# Patient Record
Sex: FEMALE | Race: WHITE | NOT HISPANIC OR LATINO | Employment: UNEMPLOYED | URBAN - METROPOLITAN AREA
[De-identification: names, ages, dates, MRNs, and addresses within clinical notes are randomized per-mention and may not be internally consistent; named-entity substitution may affect disease eponyms.]

---

## 2023-09-01 PROBLEM — G57.12 MERALGIA PARESTHETICA OF LEFT SIDE: Status: ACTIVE | Noted: 2023-09-01

## 2023-09-01 PROBLEM — K21.9 GERD WITHOUT ESOPHAGITIS: Status: ACTIVE | Noted: 2023-09-01

## 2023-09-01 PROBLEM — E04.9 ENLARGED THYROID: Status: ACTIVE | Noted: 2023-09-01

## 2023-09-01 PROBLEM — R41.840 ATTENTION DEFICIT: Status: ACTIVE | Noted: 2023-09-01

## 2023-09-01 PROBLEM — F41.1 GENERALIZED ANXIETY DISORDER: Status: ACTIVE | Noted: 2023-09-01

## 2023-09-01 PROBLEM — E04.2 MULTIPLE THYROID NODULES: Status: ACTIVE | Noted: 2023-09-01

## 2023-09-01 PROBLEM — L20.82 FLEXURAL ECZEMA: Status: ACTIVE | Noted: 2023-09-01

## 2023-09-01 PROBLEM — M25.852 FEMOROACETABULAR IMPINGEMENT OF LEFT HIP: Status: ACTIVE | Noted: 2023-09-01

## 2023-09-01 PROBLEM — D50.9 IRON DEFICIENCY ANEMIA: Status: ACTIVE | Noted: 2023-09-01

## 2023-09-01 PROBLEM — N91.2 AMENORRHEA: Status: ACTIVE | Noted: 2023-09-01

## 2023-09-01 PROBLEM — M76.899 ADDUCTOR TENDONITIS: Status: ACTIVE | Noted: 2023-09-01

## 2023-09-01 PROBLEM — E55.9 VITAMIN D DEFICIENCY: Status: ACTIVE | Noted: 2023-09-01

## 2023-09-01 RX ORDER — OMEPRAZOLE 20 MG/1
1 CAPSULE, DELAYED RELEASE ORAL 2 TIMES DAILY
COMMUNITY
Start: 2019-09-19 | End: 2023-11-30 | Stop reason: SDUPTHER

## 2023-09-01 RX ORDER — ACETAMINOPHEN 160 MG/5ML
15 SUSPENSION ORAL EVERY 6 HOURS
COMMUNITY
Start: 2023-02-06 | End: 2023-11-30 | Stop reason: ALTCHOICE

## 2023-09-01 RX ORDER — FERROUS SULFATE 325(65) MG
2 TABLET ORAL EVERY OTHER DAY
COMMUNITY

## 2023-09-01 RX ORDER — ESCITALOPRAM OXALATE 10 MG/1
1.5 TABLET ORAL DAILY
COMMUNITY
End: 2023-10-11

## 2023-09-01 RX ORDER — DICLOFENAC SODIUM 50 MG/1
1 TABLET, DELAYED RELEASE ORAL
COMMUNITY
Start: 2020-07-22

## 2023-09-01 RX ORDER — BUSPIRONE HYDROCHLORIDE 10 MG/1
1 TABLET ORAL 2 TIMES DAILY
COMMUNITY

## 2023-09-01 RX ORDER — FERROUS SULFATE 220 (44)/5
5 SOLUTION, ORAL ORAL DAILY
COMMUNITY
Start: 2023-02-10 | End: 2023-11-30 | Stop reason: ALTCHOICE

## 2023-09-23 LAB
BASOPHILS (10*3/UL) IN BLOOD BY AUTOMATED COUNT: 0.03 X10E9/L (ref 0–0.1)
BASOPHILS/100 LEUKOCYTES IN BLOOD BY AUTOMATED COUNT: 0.5 % (ref 0–2)
EOSINOPHILS (10*3/UL) IN BLOOD BY AUTOMATED COUNT: 0.1 X10E9/L (ref 0–0.7)
EOSINOPHILS/100 LEUKOCYTES IN BLOOD BY AUTOMATED COUNT: 1.8 % (ref 0–6)
ERYTHROCYTE DISTRIBUTION WIDTH (RATIO) BY AUTOMATED COUNT: 14.3 % (ref 11.5–14.5)
ERYTHROCYTE MEAN CORPUSCULAR HEMOGLOBIN CONCENTRATION (G/DL) BY AUTOMATED: 30.9 G/DL (ref 32–36)
ERYTHROCYTE MEAN CORPUSCULAR VOLUME (FL) BY AUTOMATED COUNT: 89 FL (ref 80–100)
ERYTHROCYTES (10*6/UL) IN BLOOD BY AUTOMATED COUNT: 4.52 X10E12/L (ref 4–5.2)
FERRITIN (UG/LL) IN SER/PLAS: 16 UG/L (ref 8–150)
HEMATOCRIT (%) IN BLOOD BY AUTOMATED COUNT: 40.1 % (ref 36–46)
HEMOGLOBIN (G/DL) IN BLOOD: 12.4 G/DL (ref 12–16)
IMMATURE GRANULOCYTES/100 LEUKOCYTES IN BLOOD BY AUTOMATED COUNT: 0.2 % (ref 0–0.9)
IRON (UG/DL) IN SER/PLAS: 54 UG/DL (ref 35–150)
IRON BINDING CAPACITY (UG/DL) IN SER/PLAS: 466 UG/DL (ref 240–445)
IRON SATURATION (%) IN SER/PLAS: 12 % (ref 25–45)
LEUKOCYTES (10*3/UL) IN BLOOD BY AUTOMATED COUNT: 5.5 X10E9/L (ref 4.4–11.3)
LYMPHOCYTES (10*3/UL) IN BLOOD BY AUTOMATED COUNT: 2.02 X10E9/L (ref 1.2–4.8)
LYMPHOCYTES/100 LEUKOCYTES IN BLOOD BY AUTOMATED COUNT: 36.7 % (ref 13–44)
MONOCYTES (10*3/UL) IN BLOOD BY AUTOMATED COUNT: 0.44 X10E9/L (ref 0.1–1)
MONOCYTES/100 LEUKOCYTES IN BLOOD BY AUTOMATED COUNT: 8 % (ref 2–10)
NEUTROPHILS (10*3/UL) IN BLOOD BY AUTOMATED COUNT: 2.91 X10E9/L (ref 1.2–7.7)
NEUTROPHILS/100 LEUKOCYTES IN BLOOD BY AUTOMATED COUNT: 52.8 % (ref 40–80)
PLATELETS (10*3/UL) IN BLOOD AUTOMATED COUNT: 368 X10E9/L (ref 150–450)
THYROTROPIN (MIU/L) IN SER/PLAS BY DETECTION LIMIT <= 0.05 MIU/L: 1.33 MIU/L (ref 0.44–3.98)

## 2023-10-11 DIAGNOSIS — F41.1 GENERALIZED ANXIETY DISORDER: Primary | ICD-10-CM

## 2023-10-11 RX ORDER — ESCITALOPRAM OXALATE 10 MG/1
15 TABLET ORAL DAILY
Qty: 45 TABLET | Refills: 3 | Status: SHIPPED | OUTPATIENT
Start: 2023-10-11 | End: 2024-04-04 | Stop reason: WASHOUT

## 2023-10-11 NOTE — TELEPHONE ENCOUNTER
Requested Prescriptions     Pending Prescriptions Disp Refills    escitalopram (Lexapro) 10 mg tablet [Pharmacy Med Name: ESCITALOPRAM 10 MG TABLET] 45 tablet 3     Sig: take 1 AND 1/2 TABLETS by mouth once daily

## 2023-11-29 PROBLEM — N91.2 AMENORRHEA: Status: RESOLVED | Noted: 2023-09-01 | Resolved: 2023-11-29

## 2023-11-29 PROBLEM — G43.109 MIGRAINE WITH AURA AND WITHOUT STATUS MIGRAINOSUS, NOT INTRACTABLE: Status: ACTIVE | Noted: 2023-11-29

## 2023-11-30 ENCOUNTER — OFFICE VISIT (OUTPATIENT)
Dept: PRIMARY CARE | Facility: CLINIC | Age: 23
End: 2023-11-30
Payer: COMMERCIAL

## 2023-11-30 VITALS
TEMPERATURE: 97.4 F | HEART RATE: 78 BPM | OXYGEN SATURATION: 98 % | WEIGHT: 124.8 LBS | BODY MASS INDEX: 22.11 KG/M2 | SYSTOLIC BLOOD PRESSURE: 102 MMHG | DIASTOLIC BLOOD PRESSURE: 64 MMHG

## 2023-11-30 DIAGNOSIS — J02.9 ACUTE PHARYNGITIS, UNSPECIFIED ETIOLOGY: ICD-10-CM

## 2023-11-30 DIAGNOSIS — G43.109 MIGRAINE WITH AURA AND WITHOUT STATUS MIGRAINOSUS, NOT INTRACTABLE: Primary | ICD-10-CM

## 2023-11-30 DIAGNOSIS — F41.1 GENERALIZED ANXIETY DISORDER: ICD-10-CM

## 2023-11-30 DIAGNOSIS — K21.9 GERD WITHOUT ESOPHAGITIS: ICD-10-CM

## 2023-11-30 LAB — POC RAPID STREP: NEGATIVE

## 2023-11-30 PROCEDURE — 99214 OFFICE O/P EST MOD 30 MIN: CPT | Performed by: FAMILY MEDICINE

## 2023-11-30 PROCEDURE — 87880 STREP A ASSAY W/OPTIC: CPT | Mod: QW | Performed by: FAMILY MEDICINE

## 2023-11-30 PROCEDURE — 87081 CULTURE SCREEN ONLY: CPT | Performed by: FAMILY MEDICINE

## 2023-11-30 PROCEDURE — 1036F TOBACCO NON-USER: CPT | Performed by: FAMILY MEDICINE

## 2023-11-30 RX ORDER — FLUOXETINE 10 MG/1
10 CAPSULE ORAL DAILY
Qty: 30 CAPSULE | Refills: 1 | Status: SHIPPED | OUTPATIENT
Start: 2023-11-30 | End: 2024-01-25

## 2023-11-30 RX ORDER — RIZATRIPTAN BENZOATE 10 MG/1
10 TABLET, ORALLY DISINTEGRATING ORAL ONCE AS NEEDED
Qty: 9 TABLET | Refills: 2 | Status: SHIPPED | OUTPATIENT
Start: 2023-11-30 | End: 2023-12-30

## 2023-11-30 RX ORDER — OMEPRAZOLE 20 MG/1
20 CAPSULE, DELAYED RELEASE ORAL 2 TIMES DAILY
Qty: 180 CAPSULE | Refills: 1 | Status: SHIPPED | OUTPATIENT
Start: 2023-11-30 | End: 2024-02-13 | Stop reason: SDUPTHER

## 2023-11-30 ASSESSMENT — PATIENT HEALTH QUESTIONNAIRE - PHQ9
1. LITTLE INTEREST OR PLEASURE IN DOING THINGS: NOT AT ALL
2. FEELING DOWN, DEPRESSED OR HOPELESS: NOT AT ALL
SUM OF ALL RESPONSES TO PHQ9 QUESTIONS 1 & 2: 0

## 2023-11-30 ASSESSMENT — ENCOUNTER SYMPTOMS
OCCASIONAL FEELINGS OF UNSTEADINESS: 0
SORE THROAT: 1
COUGH: 0
LOSS OF SENSATION IN FEET: 0
HEADACHES: 1
DEPRESSION: 0
SWOLLEN GLANDS: 1
SHORTNESS OF BREATH: 0

## 2023-11-30 ASSESSMENT — PAIN SCALES - GENERAL: PAINLEVEL: 0-NO PAIN

## 2023-11-30 NOTE — PROGRESS NOTES
Subjective   Patient ID: Geno Julien is a 23 y.o. female who presents for Migraine (F/U).    Headache:  -Type of headache: migraine with aura  -Symptoms: daily headache.  Not improving.    -Frequency: daily, worse with cold weather  -Associated symptoms: aura, light and sound sensitive  -Treatment: zolmitripan - no benefit.    -Specialist:  -Past Medications:         Cyproheptadine         Excedrin Migraine         Maxalt - sedation         Tylenol         Ibuprofen    Anxiety  -F/U: Patient is very on edge, irritable, feels burned out.   has sleep disorder -   -Symptoms have been not improved.   -She denies current suicidal and homicidal ideation.    -Current Treatment: buspar - not helping  -Counseling: not currently enrolled - limited time  -Previous treatment includes:        Wellbutrin         Sertraline     GERD  -Using omeprazole twice a day - working better.  Would like a new script.          Migraine   Associated symptoms include a sore throat and swollen glands. Pertinent negatives include no coughing.   Sore Throat   This is a new problem. The current episode started yesterday. The problem has been unchanged. There has been no fever. Associated symptoms include congestion, headaches and swollen glands. Pertinent negatives include no coughing, drooling, ear discharge or shortness of breath. She has tried nothing for the symptoms.        Review of Systems   HENT:  Positive for congestion and sore throat. Negative for drooling and ear discharge.    Respiratory:  Negative for cough and shortness of breath.    Neurological:  Positive for headaches.       Objective   There were no vitals taken for this visit.    Physical Exam  HENT:      Right Ear: Tympanic membrane and ear canal normal.      Left Ear: Tympanic membrane and ear canal normal.      Nose: No rhinorrhea.      Mouth/Throat:      Mouth: Mucous membranes are moist.      Palate: No lesions.      Pharynx: Oropharyngeal exudate and  posterior oropharyngeal erythema present. No pharyngeal swelling or uvula swelling.   Eyes:      General:         Right eye: No discharge.         Left eye: No discharge.   Cardiovascular:      Rate and Rhythm: Normal rate and regular rhythm.   Pulmonary:      Effort: Pulmonary effort is normal.      Breath sounds: No wheezing or rhonchi.   Lymphadenopathy:      Cervical: No cervical adenopathy.   Neurological:      Mental Status: She is alert.         Assessment/Plan   Diagnoses and all orders for this visit:  Migraine with aura and without status migrainosus, not intractable  GERD without esophagitis  Acute pharyngitis, unspecified etiology    Patient Instructions   -For GERD - we will change to omeprazole 20mg twice a day -working better    -For anxiety - likely due to caregiver burnout.  Recommend time for yourself to recoup - see if /parents /in laws could help watch kids, give you a break.      -For migraines - recommend nurtec every other day.  This will prevent migraines.  If you get a migraine - use maxalt to break migraine.  Ok to use tylenol and ibuprofen.     -For sore throat - rapid strep neg.  Culture ordered.  Tylenol/ibuprofen.     Phone visit in 6 weeks and in office in 3 months.

## 2023-12-01 ENCOUNTER — PATIENT MESSAGE (OUTPATIENT)
Dept: PRIMARY CARE | Facility: CLINIC | Age: 23
End: 2023-12-01
Payer: COMMERCIAL

## 2023-12-01 DIAGNOSIS — G43.109 MIGRAINE WITH AURA AND WITHOUT STATUS MIGRAINOSUS, NOT INTRACTABLE: ICD-10-CM

## 2023-12-01 DIAGNOSIS — H10.33 ACUTE CONJUNCTIVITIS OF BOTH EYES, UNSPECIFIED ACUTE CONJUNCTIVITIS TYPE: Primary | ICD-10-CM

## 2023-12-01 RX ORDER — OFLOXACIN 3 MG/ML
1 SOLUTION/ DROPS OPHTHALMIC 4 TIMES DAILY
Qty: 5 ML | Refills: 0 | Status: SHIPPED | OUTPATIENT
Start: 2023-12-01 | End: 2023-12-06

## 2023-12-04 LAB — S PYO THROAT QL CULT: NORMAL

## 2023-12-13 ENCOUNTER — TELEPHONE (OUTPATIENT)
Dept: PRIMARY CARE | Facility: CLINIC | Age: 23
End: 2023-12-13
Payer: COMMERCIAL

## 2023-12-13 ENCOUNTER — PATIENT MESSAGE (OUTPATIENT)
Dept: PRIMARY CARE | Facility: CLINIC | Age: 23
End: 2023-12-13
Payer: COMMERCIAL

## 2023-12-13 DIAGNOSIS — G43.109 MIGRAINE WITH AURA AND WITHOUT STATUS MIGRAINOSUS, NOT INTRACTABLE: Primary | ICD-10-CM

## 2024-01-02 PROBLEM — E04.9 ENLARGED THYROID: Status: RESOLVED | Noted: 2023-09-01 | Resolved: 2024-01-02

## 2024-01-02 PROBLEM — G57.12 MERALGIA PARESTHETICA OF LEFT SIDE: Status: RESOLVED | Noted: 2023-09-01 | Resolved: 2024-01-02

## 2024-01-02 PROBLEM — M76.899 ADDUCTOR TENDONITIS: Status: RESOLVED | Noted: 2023-09-01 | Resolved: 2024-01-02

## 2024-01-03 ENCOUNTER — OFFICE VISIT (OUTPATIENT)
Dept: PRIMARY CARE | Facility: CLINIC | Age: 24
End: 2024-01-03
Payer: COMMERCIAL

## 2024-01-03 VITALS
SYSTOLIC BLOOD PRESSURE: 100 MMHG | TEMPERATURE: 98.2 F | DIASTOLIC BLOOD PRESSURE: 66 MMHG | BODY MASS INDEX: 21.79 KG/M2 | HEART RATE: 82 BPM | OXYGEN SATURATION: 99 % | WEIGHT: 123 LBS

## 2024-01-03 DIAGNOSIS — J02.9 ACUTE PHARYNGITIS, UNSPECIFIED ETIOLOGY: Primary | ICD-10-CM

## 2024-01-03 DIAGNOSIS — G43.109 MIGRAINE WITH AURA AND WITHOUT STATUS MIGRAINOSUS, NOT INTRACTABLE: ICD-10-CM

## 2024-01-03 PROCEDURE — 1036F TOBACCO NON-USER: CPT | Performed by: FAMILY MEDICINE

## 2024-01-03 PROCEDURE — 99213 OFFICE O/P EST LOW 20 MIN: CPT | Performed by: FAMILY MEDICINE

## 2024-01-03 RX ORDER — CEFDINIR 250 MG/5ML
300 POWDER, FOR SUSPENSION ORAL 2 TIMES DAILY
Qty: 60 ML | Refills: 0 | Status: SHIPPED | OUTPATIENT
Start: 2024-01-03 | End: 2024-01-08

## 2024-01-03 RX ORDER — PREDNISOLONE 15 MG/5ML
30 SOLUTION ORAL 2 TIMES DAILY
COMMUNITY
Start: 2023-12-30 | End: 2024-04-04 | Stop reason: ALTCHOICE

## 2024-01-03 RX ORDER — AMOXICILLIN 400 MG/5ML
1000 POWDER, FOR SUSPENSION ORAL EVERY 12 HOURS SCHEDULED
COMMUNITY
Start: 2023-12-30 | End: 2024-04-04 | Stop reason: ALTCHOICE

## 2024-01-03 ASSESSMENT — ENCOUNTER SYMPTOMS
DEPRESSION: 0
SORE THROAT: 1
LOSS OF SENSATION IN FEET: 0
COUGH: 0
TROUBLE SWALLOWING: 1
OCCASIONAL FEELINGS OF UNSTEADINESS: 0
SWOLLEN GLANDS: 1
DIARRHEA: 1
SHORTNESS OF BREATH: 0

## 2024-01-03 ASSESSMENT — LIFESTYLE VARIABLES
HOW MANY STANDARD DRINKS CONTAINING ALCOHOL DO YOU HAVE ON A TYPICAL DAY: PATIENT DOES NOT DRINK
SKIP TO QUESTIONS 9-10: 1
HOW OFTEN DO YOU HAVE SIX OR MORE DRINKS ON ONE OCCASION: NEVER
HOW OFTEN DO YOU HAVE A DRINK CONTAINING ALCOHOL: NEVER
AUDIT-C TOTAL SCORE: 0

## 2024-01-03 ASSESSMENT — PATIENT HEALTH QUESTIONNAIRE - PHQ9
2. FEELING DOWN, DEPRESSED OR HOPELESS: NOT AT ALL
1. LITTLE INTEREST OR PLEASURE IN DOING THINGS: NOT AT ALL
SUM OF ALL RESPONSES TO PHQ9 QUESTIONS 1 & 2: 0

## 2024-01-03 ASSESSMENT — PAIN SCALES - GENERAL: PAINLEVEL: 0-NO PAIN

## 2024-01-03 NOTE — PROGRESS NOTES
Subjective   Patient ID: Geno Julien is a 23 y.o. female who presents for Sore Throat.    Sore Throat   This is a new problem. Episode onset: Marj day. Associated symptoms include congestion, diarrhea, swollen glands and trouble swallowing. Pertinent negatives include no coughing or shortness of breath. Associated symptoms comments: Sore throat.  No ear pain.  Still sore throat.  Headache.   Nausea from the antibiotic.  . She has had no exposure to strep. Treatments tried: Antibiotic - 4 days ago.        Review of Systems   HENT:  Positive for congestion, sore throat and trouble swallowing.    Respiratory:  Negative for cough and shortness of breath.    Gastrointestinal:  Positive for diarrhea.       Objective   /66 (BP Location: Right arm, Patient Position: Sitting)   Pulse 82   Temp 36.8 °C (98.2 °F) (Temporal)   Wt 55.8 kg (123 lb)   SpO2 99%   BMI 21.79 kg/m²     Physical Exam  HENT:      Right Ear: Tympanic membrane and ear canal normal.      Left Ear: Tympanic membrane and ear canal normal.      Nose: No rhinorrhea.      Mouth/Throat:      Mouth: Mucous membranes are moist.      Palate: No lesions.      Pharynx: Oropharyngeal exudate and posterior oropharyngeal erythema present. No pharyngeal swelling or uvula swelling.   Eyes:      General:         Right eye: No discharge.         Left eye: No discharge.   Cardiovascular:      Rate and Rhythm: Normal rate and regular rhythm.   Pulmonary:      Effort: Pulmonary effort is normal.      Breath sounds: No wheezing or rhonchi.   Lymphadenopathy:      Cervical: Cervical adenopathy present.   Neurological:      Mental Status: She is alert.         Assessment/Plan

## 2024-01-05 ENCOUNTER — PATIENT MESSAGE (OUTPATIENT)
Dept: PRIMARY CARE | Facility: CLINIC | Age: 24
End: 2024-01-05
Payer: COMMERCIAL

## 2024-01-05 ENCOUNTER — APPOINTMENT (OUTPATIENT)
Dept: PRIMARY CARE | Facility: CLINIC | Age: 24
End: 2024-01-05
Payer: COMMERCIAL

## 2024-01-05 ENCOUNTER — TELEPHONE (OUTPATIENT)
Dept: PRIMARY CARE | Facility: CLINIC | Age: 24
End: 2024-01-05
Payer: COMMERCIAL

## 2024-01-05 DIAGNOSIS — G43.109 MIGRAINE WITH AURA AND WITHOUT STATUS MIGRAINOSUS, NOT INTRACTABLE: ICD-10-CM

## 2024-01-05 NOTE — TELEPHONE ENCOUNTER
----- Message from Allan Logan LPN sent at 1/5/2024 12:52 PM EST -----  Regarding: FW: [facility]   Contact: [phone]    ----- Message -----  From: Geno Julien  Sent: 1/5/2024  12:44 PM EST  To: Nirm[facility] Clinical Support Staff  Subject: Nurtec                                           Hi! I just went to the pharmacy they said they need a prior authorization again for the nurtec and they we’re waiting on the drs office. I wasn’t sure if you guys received anything or not.

## 2024-01-05 NOTE — TELEPHONE ENCOUNTER
Pharmacy states they needed PA, per our pharmacy techs no PA needed. Patient's pharmacy did not have correct insurances. Informed pt and she will go to her pharmacy to give corrected insurance

## 2024-01-25 DIAGNOSIS — F41.1 GENERALIZED ANXIETY DISORDER: ICD-10-CM

## 2024-01-25 RX ORDER — FLUOXETINE 10 MG/1
10 CAPSULE ORAL DAILY
Qty: 30 CAPSULE | Refills: 1 | Status: SHIPPED | OUTPATIENT
Start: 2024-01-25 | End: 2024-03-25

## 2024-01-30 DIAGNOSIS — K21.9 GERD WITHOUT ESOPHAGITIS: Primary | ICD-10-CM

## 2024-01-30 RX ORDER — ONDANSETRON 4 MG/1
4 TABLET, FILM COATED ORAL EVERY 8 HOURS PRN
Qty: 20 TABLET | Refills: 1 | Status: SHIPPED | OUTPATIENT
Start: 2024-01-30

## 2024-01-30 RX ORDER — ONDANSETRON 4 MG/1
4 TABLET, FILM COATED ORAL EVERY 8 HOURS PRN
COMMUNITY
End: 2024-01-30 | Stop reason: SDUPTHER

## 2024-01-30 NOTE — TELEPHONE ENCOUNTER
Requested Prescriptions     Pending Prescriptions Disp Refills    ondansetron (Zofran) 4 mg tablet 20 tablet 1     Sig: Take 1 tablet (4 mg) by mouth every 8 hours if needed for nausea or vomiting.

## 2024-01-30 NOTE — TELEPHONE ENCOUNTER
----- Message from Lorin Gonzalez LPN sent at 1/30/2024  8:27 AM EST -----  Regarding: FW: Stomach flu  Contact: [phone]    ----- Message -----  From: Geno Julien  Sent: 1/30/2024   8:22 AM EST  To: Nirm[facility] Clinical Support Staff  Subject: Stomach flu                                      Would he be able to send another rx for zofran? I only had 1 left

## 2024-02-10 DIAGNOSIS — K21.9 GERD WITHOUT ESOPHAGITIS: Primary | ICD-10-CM

## 2024-02-12 ENCOUNTER — PATIENT MESSAGE (OUTPATIENT)
Dept: PRIMARY CARE | Facility: CLINIC | Age: 24
End: 2024-02-12
Payer: COMMERCIAL

## 2024-02-12 DIAGNOSIS — K21.9 GERD WITHOUT ESOPHAGITIS: ICD-10-CM

## 2024-02-12 RX ORDER — OMEPRAZOLE 40 MG/1
CAPSULE, DELAYED RELEASE ORAL
Qty: 90 CAPSULE | Refills: 3 | Status: SHIPPED | OUTPATIENT
Start: 2024-02-12 | End: 2024-02-13 | Stop reason: WASHOUT

## 2024-02-13 RX ORDER — OMEPRAZOLE 20 MG/1
20 CAPSULE, DELAYED RELEASE ORAL 2 TIMES DAILY
Qty: 180 CAPSULE | Refills: 1 | Status: SHIPPED | OUTPATIENT
Start: 2024-02-13

## 2024-03-01 ENCOUNTER — APPOINTMENT (OUTPATIENT)
Dept: PRIMARY CARE | Facility: CLINIC | Age: 24
End: 2024-03-01
Payer: COMMERCIAL

## 2024-03-25 DIAGNOSIS — F41.1 GENERALIZED ANXIETY DISORDER: ICD-10-CM

## 2024-03-25 RX ORDER — FLUOXETINE 10 MG/1
10 CAPSULE ORAL DAILY
Qty: 90 CAPSULE | Refills: 1 | Status: SHIPPED | OUTPATIENT
Start: 2024-03-25 | End: 2024-04-04 | Stop reason: SINTOL

## 2024-03-25 NOTE — TELEPHONE ENCOUNTER
Requested Prescriptions     Pending Prescriptions Disp Refills    FLUoxetine (PROzac) 10 mg capsule [Pharmacy Med Name: FLUOXETINE HCL 10 MG CAPSULE] 90 capsule 1     Sig: take 1 capsule by mouth once daily

## 2024-04-01 ENCOUNTER — PATIENT MESSAGE (OUTPATIENT)
Dept: PRIMARY CARE | Facility: CLINIC | Age: 24
End: 2024-04-01
Payer: COMMERCIAL

## 2024-04-01 DIAGNOSIS — N30.00 ACUTE CYSTITIS WITHOUT HEMATURIA: Primary | ICD-10-CM

## 2024-04-01 RX ORDER — CEPHALEXIN 250 MG/5ML
POWDER, FOR SUSPENSION ORAL
Qty: 140 ML | Refills: 0 | Status: SHIPPED | OUTPATIENT
Start: 2024-04-01

## 2024-04-04 ENCOUNTER — OFFICE VISIT (OUTPATIENT)
Dept: PRIMARY CARE | Facility: CLINIC | Age: 24
End: 2024-04-04
Payer: COMMERCIAL

## 2024-04-04 VITALS
SYSTOLIC BLOOD PRESSURE: 104 MMHG | DIASTOLIC BLOOD PRESSURE: 66 MMHG | WEIGHT: 123.6 LBS | HEART RATE: 73 BPM | BODY MASS INDEX: 21.89 KG/M2 | OXYGEN SATURATION: 97 %

## 2024-04-04 DIAGNOSIS — G43.109 MIGRAINE WITH AURA AND WITHOUT STATUS MIGRAINOSUS, NOT INTRACTABLE: ICD-10-CM

## 2024-04-04 DIAGNOSIS — F41.1 GENERALIZED ANXIETY DISORDER: Primary | ICD-10-CM

## 2024-04-04 DIAGNOSIS — D50.9 IRON DEFICIENCY ANEMIA, UNSPECIFIED IRON DEFICIENCY ANEMIA TYPE: ICD-10-CM

## 2024-04-04 DIAGNOSIS — E55.9 VITAMIN D DEFICIENCY: ICD-10-CM

## 2024-04-04 PROCEDURE — 99214 OFFICE O/P EST MOD 30 MIN: CPT | Performed by: FAMILY MEDICINE

## 2024-04-04 PROCEDURE — 1036F TOBACCO NON-USER: CPT | Performed by: FAMILY MEDICINE

## 2024-04-04 RX ORDER — CITALOPRAM 20 MG/1
20 TABLET, FILM COATED ORAL DAILY
Qty: 30 TABLET | Refills: 1 | Status: SHIPPED | OUTPATIENT
Start: 2024-04-04 | End: 2024-05-29

## 2024-04-04 ASSESSMENT — PAIN SCALES - GENERAL: PAINLEVEL: 0-NO PAIN

## 2024-04-04 ASSESSMENT — ENCOUNTER SYMPTOMS
DEPRESSION: 0
OCCASIONAL FEELINGS OF UNSTEADINESS: 0
SORE THROAT: 1
SWOLLEN GLANDS: 1
HEADACHES: 1
COUGH: 0
LOSS OF SENSATION IN FEET: 0
SHORTNESS OF BREATH: 0

## 2024-04-04 ASSESSMENT — LIFESTYLE VARIABLES
HOW MANY STANDARD DRINKS CONTAINING ALCOHOL DO YOU HAVE ON A TYPICAL DAY: 1 OR 2
SKIP TO QUESTIONS 9-10: 1
AUDIT-C TOTAL SCORE: 1
HOW OFTEN DO YOU HAVE SIX OR MORE DRINKS ON ONE OCCASION: NEVER
HOW OFTEN DO YOU HAVE A DRINK CONTAINING ALCOHOL: MONTHLY OR LESS

## 2024-04-04 ASSESSMENT — PATIENT HEALTH QUESTIONNAIRE - PHQ9
1. LITTLE INTEREST OR PLEASURE IN DOING THINGS: NOT AT ALL
SUM OF ALL RESPONSES TO PHQ9 QUESTIONS 1 & 2: 0
2. FEELING DOWN, DEPRESSED OR HOPELESS: NOT AT ALL

## 2024-04-04 NOTE — PROGRESS NOTES
Subjective   Patient ID: Geno Julien is a 23 y.o. female who presents for Migraine.    Headache:  -Type of headache: migraine with aura  -Symptoms: daily headache.  Improved with nurtec.    -Frequency: daily, worse with cold weather  -Associated symptoms: aura, light and sound sensitive  -Treatment: zolmitripan - no benefit.    -Specialist:  -Past Medications:         Cyproheptadine         Excedrin Migraine         Maxalt - sedation         Tylenol         Ibuprofen    Anxiety  -F/U: Pt does not like fluoxetine.  Is on buspar.  Anxiety is back.  Side effects with prozac.    -Symptoms have been not improved.   -She denies current suicidal and homicidal ideation.    -Current Treatment: buspar - not helping  -Counseling: not currently enrolled - limited time  -Previous treatment includes:        Wellbutrin         Sertraline        Prozac    GERD  -Using omeprazole twice a day - working better.          Migraine   Associated symptoms include a sore throat and swollen glands. Pertinent negatives include no coughing.   Sore Throat   This is a new problem. The current episode started yesterday. The problem has been unchanged. There has been no fever. Associated symptoms include congestion, headaches and swollen glands. Pertinent negatives include no coughing, drooling, ear discharge or shortness of breath. She has tried nothing for the symptoms.        Review of Systems   HENT:  Positive for congestion and sore throat. Negative for drooling and ear discharge.    Respiratory:  Negative for cough and shortness of breath.    Neurological:  Positive for headaches.       Objective   /66 (BP Location: Left arm, Patient Position: Sitting)   Pulse 73   Wt 56.1 kg (123 lb 9.6 oz)   SpO2 97%   BMI 21.89 kg/m²     Physical Exam  Vitals reviewed.   Constitutional:       General: She is not in acute distress.  Cardiovascular:      Rate and Rhythm: Normal rate and regular rhythm.   Pulmonary:      Effort:  Pulmonary effort is normal.      Breath sounds: No wheezing or rhonchi.   Musculoskeletal:      Right lower leg: No edema.      Left lower leg: No edema.   Lymphadenopathy:      Cervical: No cervical adenopathy.   Neurological:      Mental Status: She is alert.         Assessment/Plan   Diagnoses and all orders for this visit:  Generalized anxiety disorder  Migraine with aura and without status migrainosus, not intractable  Iron deficiency anemia, unspecified iron deficiency anemia type  Vitamin D deficiency    Patient Instructions   For headaches - stable on nurtec and maxalt.     For anxiety - side effects with prozac.  We will change to celexa - take 10mg for 2 weeks, then whole tab (20mg)     For iron deficiency - order for blood work.      Follow up in 4 months.  Call if worsening.

## 2024-04-04 NOTE — PATIENT INSTRUCTIONS
For headaches - stable on nurtec and maxalt.     For anxiety - side effects with prozac.  We will change to celexa - take 10mg for 2 weeks, then whole tab (20mg)     For iron deficiency - order for blood work.      Follow up in 4 months.  Call if worsening.

## 2024-04-05 ENCOUNTER — LAB (OUTPATIENT)
Dept: LAB | Facility: LAB | Age: 24
End: 2024-04-05
Payer: COMMERCIAL

## 2024-04-05 DIAGNOSIS — D50.9 IRON DEFICIENCY ANEMIA, UNSPECIFIED IRON DEFICIENCY ANEMIA TYPE: ICD-10-CM

## 2024-04-05 DIAGNOSIS — F41.1 GENERALIZED ANXIETY DISORDER: ICD-10-CM

## 2024-04-05 DIAGNOSIS — E55.9 VITAMIN D DEFICIENCY: ICD-10-CM

## 2024-04-05 DIAGNOSIS — G43.109 MIGRAINE WITH AURA AND WITHOUT STATUS MIGRAINOSUS, NOT INTRACTABLE: ICD-10-CM

## 2024-04-05 LAB
25(OH)D3 SERPL-MCNC: 24 NG/ML (ref 30–100)
ALBUMIN SERPL BCP-MCNC: 4.3 G/DL (ref 3.4–5)
ALP SERPL-CCNC: 39 U/L (ref 33–110)
ALT SERPL W P-5'-P-CCNC: 10 U/L (ref 7–45)
ANION GAP SERPL CALC-SCNC: 13 MMOL/L (ref 10–20)
AST SERPL W P-5'-P-CCNC: 12 U/L (ref 9–39)
BASOPHILS # BLD AUTO: 0.03 X10*3/UL (ref 0–0.1)
BASOPHILS NFR BLD AUTO: 0.5 %
BILIRUB SERPL-MCNC: 0.3 MG/DL (ref 0–1.2)
BUN SERPL-MCNC: 15 MG/DL (ref 6–23)
CALCIUM SERPL-MCNC: 9.3 MG/DL (ref 8.6–10.3)
CHLORIDE SERPL-SCNC: 106 MMOL/L (ref 98–107)
CO2 SERPL-SCNC: 26 MMOL/L (ref 21–32)
CREAT SERPL-MCNC: 0.61 MG/DL (ref 0.5–1.05)
EGFRCR SERPLBLD CKD-EPI 2021: >90 ML/MIN/1.73M*2
EOSINOPHIL # BLD AUTO: 0.12 X10*3/UL (ref 0–0.7)
EOSINOPHIL NFR BLD AUTO: 2.1 %
ERYTHROCYTE [DISTWIDTH] IN BLOOD BY AUTOMATED COUNT: 13.6 % (ref 11.5–14.5)
FERRITIN SERPL-MCNC: 15 NG/ML (ref 8–150)
GLUCOSE SERPL-MCNC: 85 MG/DL (ref 74–99)
HCT VFR BLD AUTO: 39.5 % (ref 36–46)
HGB BLD-MCNC: 12.4 G/DL (ref 12–16)
IMM GRANULOCYTES # BLD AUTO: 0.02 X10*3/UL (ref 0–0.7)
IMM GRANULOCYTES NFR BLD AUTO: 0.4 % (ref 0–0.9)
IRON SATN MFR SERPL: 11 % (ref 25–45)
IRON SERPL-MCNC: 48 UG/DL (ref 35–150)
LYMPHOCYTES # BLD AUTO: 2.11 X10*3/UL (ref 1.2–4.8)
LYMPHOCYTES NFR BLD AUTO: 37.5 %
MCH RBC QN AUTO: 28.2 PG (ref 26–34)
MCHC RBC AUTO-ENTMCNC: 31.4 G/DL (ref 32–36)
MCV RBC AUTO: 90 FL (ref 80–100)
MONOCYTES # BLD AUTO: 0.38 X10*3/UL (ref 0.1–1)
MONOCYTES NFR BLD AUTO: 6.8 %
NEUTROPHILS # BLD AUTO: 2.96 X10*3/UL (ref 1.2–7.7)
NEUTROPHILS NFR BLD AUTO: 52.7 %
NRBC BLD-RTO: 0 /100 WBCS (ref 0–0)
PLATELET # BLD AUTO: 330 X10*3/UL (ref 150–450)
POTASSIUM SERPL-SCNC: 4.4 MMOL/L (ref 3.5–5.3)
PROT SERPL-MCNC: 6.6 G/DL (ref 6.4–8.2)
RBC # BLD AUTO: 4.4 X10*6/UL (ref 4–5.2)
SODIUM SERPL-SCNC: 141 MMOL/L (ref 136–145)
TIBC SERPL-MCNC: 422 UG/DL (ref 240–445)
TSH SERPL-ACNC: 0.65 MIU/L (ref 0.44–3.98)
UIBC SERPL-MCNC: 374 UG/DL (ref 110–370)
WBC # BLD AUTO: 5.6 X10*3/UL (ref 4.4–11.3)

## 2024-04-05 PROCEDURE — 82306 VITAMIN D 25 HYDROXY: CPT

## 2024-04-05 PROCEDURE — 82728 ASSAY OF FERRITIN: CPT

## 2024-04-05 PROCEDURE — 83550 IRON BINDING TEST: CPT

## 2024-04-05 PROCEDURE — 84443 ASSAY THYROID STIM HORMONE: CPT

## 2024-04-05 PROCEDURE — 85025 COMPLETE CBC W/AUTO DIFF WBC: CPT

## 2024-04-05 PROCEDURE — 83540 ASSAY OF IRON: CPT

## 2024-04-05 PROCEDURE — 36415 COLL VENOUS BLD VENIPUNCTURE: CPT

## 2024-04-05 PROCEDURE — 80053 COMPREHEN METABOLIC PANEL: CPT

## 2024-05-29 DIAGNOSIS — F41.1 GENERALIZED ANXIETY DISORDER: ICD-10-CM

## 2024-05-29 RX ORDER — CITALOPRAM 20 MG/1
20 TABLET, FILM COATED ORAL DAILY
Qty: 30 TABLET | Refills: 1 | Status: SHIPPED | OUTPATIENT
Start: 2024-05-29

## 2024-08-15 ENCOUNTER — OFFICE VISIT (OUTPATIENT)
Dept: PRIMARY CARE | Facility: CLINIC | Age: 24
End: 2024-08-15
Payer: COMMERCIAL

## 2024-08-15 VITALS
DIASTOLIC BLOOD PRESSURE: 62 MMHG | BODY MASS INDEX: 22.28 KG/M2 | OXYGEN SATURATION: 97 % | WEIGHT: 125.8 LBS | HEART RATE: 91 BPM | SYSTOLIC BLOOD PRESSURE: 104 MMHG

## 2024-08-15 DIAGNOSIS — G43.109 MIGRAINE WITH AURA AND WITHOUT STATUS MIGRAINOSUS, NOT INTRACTABLE: ICD-10-CM

## 2024-08-15 DIAGNOSIS — M54.9 BACK PAIN, UNSPECIFIED BACK LOCATION, UNSPECIFIED BACK PAIN LATERALITY, UNSPECIFIED CHRONICITY: ICD-10-CM

## 2024-08-15 DIAGNOSIS — F41.1 GENERALIZED ANXIETY DISORDER: Primary | ICD-10-CM

## 2024-08-15 DIAGNOSIS — K21.9 GERD WITHOUT ESOPHAGITIS: ICD-10-CM

## 2024-08-15 PROCEDURE — 99214 OFFICE O/P EST MOD 30 MIN: CPT | Performed by: FAMILY MEDICINE

## 2024-08-15 PROCEDURE — 1036F TOBACCO NON-USER: CPT | Performed by: FAMILY MEDICINE

## 2024-08-15 RX ORDER — ESCITALOPRAM OXALATE 5 MG/1
5 TABLET ORAL DAILY
Qty: 30 TABLET | Refills: 2 | Status: SHIPPED | OUTPATIENT
Start: 2024-08-15 | End: 2024-11-13

## 2024-08-15 ASSESSMENT — LIFESTYLE VARIABLES
SKIP TO QUESTIONS 9-10: 1
HOW OFTEN DO YOU HAVE SIX OR MORE DRINKS ON ONE OCCASION: NEVER
HOW OFTEN DO YOU HAVE A DRINK CONTAINING ALCOHOL: MONTHLY OR LESS
HOW MANY STANDARD DRINKS CONTAINING ALCOHOL DO YOU HAVE ON A TYPICAL DAY: 1 OR 2
AUDIT-C TOTAL SCORE: 1

## 2024-08-15 ASSESSMENT — ENCOUNTER SYMPTOMS
DEPRESSION: 0
LOSS OF SENSATION IN FEET: 0
OCCASIONAL FEELINGS OF UNSTEADINESS: 0

## 2024-08-15 ASSESSMENT — PAIN SCALES - GENERAL: PAINLEVEL: 0-NO PAIN

## 2024-08-15 NOTE — PATIENT INSTRUCTIONS
Here for follow up    For anxiety - change to lexapro.  Agree with testing for ADHD.      For reflux - referral to Dr. Reno  at [facility] for EGD.     For hives - allegra/zyrtec/claritin, cool compresses    For back and hip pain - referral for chiropractor.      Follow up 4 months.

## 2024-08-15 NOTE — PROGRESS NOTES
Subjective   Patient ID: Geno Julien is a 24 y.o. female who presents for 4 MO F/U (Migraines/Anxiety).    Patient is here for follow up.      Headache:  -Type of headache: migraine with aura  -Symptoms: daily headache.  Nurtec not covered.  Using maxalt.    -Frequency: daily, worse with cold weather  -Associated symptoms: aura, light and sound sensitive  -Treatment: zolmitripan - no benefit.    -Specialist:  -Past Medications:         Cyproheptadine         Excedrin Migraine         Maxalt - sedation         Tylenol         Ibuprofen    Anxiety  -F/U: Pt tried celexa - did not like.  Preferred lexapro.  Pt is concerned about ADHD - pt will be going to get tested.    -Symptoms have been not improved.   -She denies current suicidal and homicidal ideation.    -Current Treatment: buspar, celexa  -Counseling: not currently enrolled - limited time  -Previous treatment includes:        Wellbutrin         Sertraline        Prozac    GERD  -Using omeprazole twice a day - not improved.  No dysphagia.               Review of Systems    Objective   /62 (BP Location: Right arm, Patient Position: Sitting)   Pulse 91   Wt 57.1 kg (125 lb 12.8 oz)   SpO2 97%   BMI 22.28 kg/m²     Physical Exam  Vitals reviewed.   Constitutional:       General: She is not in acute distress.  Cardiovascular:      Rate and Rhythm: Normal rate and regular rhythm.   Pulmonary:      Effort: Pulmonary effort is normal.      Breath sounds: No wheezing or rhonchi.   Musculoskeletal:      Right lower leg: No edema.      Left lower leg: No edema.   Lymphadenopathy:      Cervical: No cervical adenopathy.   Neurological:      Mental Status: She is alert.         Assessment/Plan   Diagnoses and all orders for this visit:  Generalized anxiety disorder  Migraine with aura and without status migrainosus, not intractable  GERD without esophagitis    Patient Instructions   Here for follow up    For anxiety - change to lexapro.  Agree with  testing for ADHD.      For reflux - referral to Dr. Reno  at [facility] for EGD.     For hives - allegra/zyrtec/claritin, cool compresses    For back and hip pain - referral for chiropractor.      Follow up 4 months.

## 2024-08-22 ENCOUNTER — APPOINTMENT (OUTPATIENT)
Dept: PRIMARY CARE | Facility: CLINIC | Age: 24
End: 2024-08-22
Payer: COMMERCIAL

## 2024-08-26 ENCOUNTER — APPOINTMENT (OUTPATIENT)
Dept: PRIMARY CARE | Facility: CLINIC | Age: 24
End: 2024-08-26
Payer: COMMERCIAL

## 2024-09-06 ENCOUNTER — PATIENT MESSAGE (OUTPATIENT)
Dept: PRIMARY CARE | Facility: CLINIC | Age: 24
End: 2024-09-06
Payer: COMMERCIAL

## 2024-09-06 DIAGNOSIS — J02.9 ACUTE PHARYNGITIS, UNSPECIFIED ETIOLOGY: Primary | ICD-10-CM

## 2024-09-06 RX ORDER — CEFDINIR 250 MG/5ML
POWDER, FOR SUSPENSION ORAL
Qty: 84 ML | Refills: 0 | Status: SHIPPED | OUTPATIENT
Start: 2024-09-06

## 2024-09-17 ENCOUNTER — TELEPHONE (OUTPATIENT)
Dept: SURGERY | Facility: CLINIC | Age: 24
End: 2024-09-17
Payer: COMMERCIAL

## 2024-09-17 NOTE — TELEPHONE ENCOUNTER
Thank you for speaking with me earlier today & confirming your scheduled visit with Dr. Emery on 9/23/24 11:15AM at [facility] [address] ([address]) [place] [address] (inside [facility], main floor in the [facility]).   Parking is available at a cost of $5.00 at the Main Entrance.   Please note our clinic exam rooms are air conditioned so you may want to bring a jacket.  We look forward to seeing you, Mai Sutherland LPN Clinic Nurse.

## 2024-09-17 NOTE — PROGRESS NOTES
"GENERAL SURGERY REFLUX SURGERY NEW PATIENT CONSULTATION  HISTORY AND PHYSICAL    CLINIC DATE:  9/23/24    NAME: Geno Julien  24 y.o.  MRN: [id number]    WEIGHT / BMI TODAY:  126 LBS/ BMI: 23.05  Wt Readings from Last 1 Encounters:   09/23/24 57.2 kg (126 lb)      BMI Readings from Last 1 Encounters:   09/23/24 23.05 kg/m²       SURGEON:  Dr. Angelita Emery    PRESENTING TODAY for General Surgery initial consult visit:  This LPN spoke with on 9/17/24 her, she reporting Initial onset of symptoms was:  2018 Pregnant with first child.  Self verbalizes concerns:   \"now always feel like something always stuck in throat, when eat cannot swallow other days can, takes 2-3 doses of PPI +TUMS/Rolaids nearly daily, afraid to eat when goes out and this stuck feeling makes her anxiety levels get very high, wants to try for their 4th child but cannot live like this thru another pregnancy or in general anymore.  Referral from Dr. DAVIS Carpio DO.  Notes shaterted in 2017 with first pregnancy and gotten worse with each one.  Ever since her c section food gets stuck and so does not want to eat.  Only eats dinner and minimizes in the day as does not want to coke on food. Pain under the left rib cage, worse with coughing.  She started omeprazole 20 bid and sometimes takes 3 and tums and still does not help.  Other days fine  Can't do red sauce.  Gets a burning on left side in upper chest.  Went to hospital twice for it and arm goes numb.  Did a grasshopper that did not do much.  No gaviscon or mylanta in past. Never had egd  CURRENT SYMPTOMS:     Abdominal pain:  Yes      \"Left upper side under rib cage\"    Abdominal bloating:  Yes \"feel so bloated feel like my ribs stick out\"        Burping:  Yes         Chest Pain:  Yes  \"burning left side mostly, ED said was just Acid Reflux after checked me out\"    Chronic Cough: No         Constipation: No    Diarrhea: No    Dysphagia:  Yes       Experiencing hunger: No    Food Intolerances: " "Yes  \"Milk products, red sauce, pizza, coffee\"    Food Sticking:  Yes    Gassy:  No    Hiccups: No          Hoarseness:  No       Hx Gastric Ulcers:  No         Nausea/vomiting: No    Symptoms Affect Ability to Excercise:  No         Throat Clearing:  Yes             DIET HISTORY:      Carbonated Beverages: No          Caffeinated Beverages: Yes    Fluid intake: feels stays hydrated oz/day        GERD - Health Related Quality of Life Questionnaire (GERD- HRQL)  On PPI  Omeprazole 20mg twice daily, sometimes takes 3 per day plus TUMS/Rolaids    How bad is the heartburn? 2 = Symptoms noticeable and bothersome but not every day  Heartburn when lying down? 2 = Symptoms noticeable and bothersome but not every day  Heartburn when standing up? 2 = Symptoms noticeable and bothersome but not every day  Heartburn after meals? 2 = Symptoms noticeable and bothersome but not every day  Does heartburn change your diet? 4 = Symptoms affect daily activity  Does heartburn wake you from sleep? 2 = Symptoms noticeable and bothersome but not every day    Do you have difficulty swallowing? 2 = Symptoms noticeable and bothersome but not every day  Do you have pain with swallowing? 0 = No symptoms  If you take medication, does this affect your daily life? 4 = Symptoms affect daily activity  always have to carry meds with her    How bad is the regurgitation? 2 = Symptoms noticeable and bothersome but not every day  Regurgitation when lying down? 2 = Symptoms noticeable and bothersome but not every day  Regurgitation when standing up? 2 = Symptoms noticeable and bothersome but not every day  Regurgitation after meals? 2 = Symptoms noticeable and bothersome but not every day  Does regurgitation change your diet? 4 = Symptoms affect daily activity  Does regurgitation wake you from sleep? 2 = Symptoms noticeable and bothersome but not every day  How satisfied are you with your present condition? Dissatisfied    Total score (calculated by " summing the individual scores of questions 1-15): 34   Greatest possible score 75 (worst symptoms).   Lowest possible score 0 (no symptoms).    Heartburn score (calculated by summing the individual scores of questions 1-6): 14   Worst heartburn symptoms: 30.   No heartburn symptoms: 0.   Score less than or equal to 12 with each individual question not exceeding 2 indicate heartburn elimination.    Regurgitation score (calculated by summing the individual scores of questions 10-15): 14   Worst regurgitation symptoms: 30.   No regurgitation symptoms: 0.   Score less than or equal to 12 with each individual question not exceeding 2 indicate regurgitation elimination.       OTHER PROVIDER TEST RESULTS/LAST NOTE:    Office Visit  8/15/2024  [facility]       Ebenezer Carpio DO  Family Medicine Generalized anxiety disorder +3 more  Dx 4 MO F/U   Reason for Visit     Progress Notes  Ebenezer Carpio DO (Physician)  Primary Care  Expand All Collapse All     Subjective  Patient ID: Geno Julien is a 24 y.o. female who presents for 4 MO F/U (Migraines/Anxiety).     Patient is here for follow up.       Headache:  -Type of headache: migraine with aura  -Symptoms: daily headache.  Nurtec not covered.  Using maxalt.    -Frequency: daily, worse with cold weather  -Associated symptoms: aura, light and sound sensitive  -Treatment: zolmitripan - no benefit.    -Specialist:  -Past Medications:         Cyproheptadine         Excedrin Migraine         Maxalt - sedation         Tylenol         Ibuprofen     Anxiety  -F/U: Pt tried celexa - did not like.  Preferred lexapro.  Pt is concerned about ADHD - pt will be going to get tested.    -Symptoms have been not improved.   -She denies current suicidal and homicidal ideation.    -Current Treatment: buspar, celexa  -Counseling: not currently enrolled - limited time  -Previous treatment includes:        Wellbutrin         Sertraline        Prozac      GERD  -Using omeprazole twice a day - not improved.  No dysphagia.                   Review of Systems           Objective  /62 (BP Location: Right arm, Patient Position: Sitting)   Pulse 91   Wt 57.1 kg (125 lb 12.8 oz)   SpO2 97%   BMI 22.28 kg/m²      Physical Exam  Vitals reviewed.   Constitutional:       General: She is not in acute distress.  Cardiovascular:      Rate and Rhythm: Normal rate and regular rhythm.   Pulmonary:      Effort: Pulmonary effort is normal.      Breath sounds: No wheezing or rhonchi.   Musculoskeletal:      Right lower leg: No edema.      Left lower leg: No edema.   Lymphadenopathy:      Cervical: No cervical adenopathy.   Neurological:      Mental Status: She is alert.                  Assessment/Plan  Diagnoses and all orders for this visit:  Generalized anxiety disorder  Migraine with aura and without status migrainosus, not intractable  GERD without esophagitis     Patient Instructions   Here for follow up     For anxiety - change to lexapro.  Agree with testing for ADHD.       For reflux - referral to Dr. Reno  at [facility] for EGD.      For hives - allegra/zyrtec/claritin, cool compresses     For back and hip pain - referral for chiropractor.       Follow up 4 months.                 Electronically signed by Ebenezer Carpio DO at 8/15/2024 10:32 AM     Instructions    Here for follow up     For anxiety - change to lexapro.  Agree with testing for ADHD.       For reflux - referral to Dr. Reno  at [facility] for EGD.      For hives - allegra/zyrtec/claritin, cool compresses     For back and hip pain - referral for chiropractor.       Follow up 4 months.              CURRENT MEDICATIONS:    Current Outpatient Medications   Medication Sig Dispense Refill    busPIRone (Buspar) 10 mg tablet Take 1 tablet (10 mg) by mouth 2 times a day. For 30 days      escitalopram (Lexapro) 5 mg tablet Take 1 tablet (5 mg) by mouth once daily. 30 tablet 2    ferrous sulfate 325 (65  Fe) MG tablet Take 2 tablets (650 mg) by mouth every other day.      omeprazole (PriLOSEC) 20 mg DR capsule Take 1 capsule (20 mg) by mouth 2 times a day. For 90 days 180 capsule 1    PNV no.95/ferrous fum/folic ac (PRENATAL ORAL) Take 1 tablet by mouth once daily.      cefdinir (Omnicef) 250 mg/5 mL suspension 6 ml PO q BID x 7 days 84 mL 0    diclofenac (Voltaren) 50 mg EC tablet Take 1 tablet (50 mg) by mouth 2 times daily (morning and late afternoon).      ondansetron (Zofran) 4 mg tablet Take 1 tablet (4 mg) by mouth every 8 hours if needed for nausea or vomiting. 20 tablet 1    rimegepant (Nurtec ODT) 75 mg tablet,disintegrating Take 1 tablet (75 mg) by mouth every other day. (Patient not taking: Reported on 2024) 15 tablet 3    rizatriptan MLT (Maxalt-MLT) 10 mg disintegrating tablet Take 1 tablet (10 mg) by mouth 1 time if needed for migraine. May repeat in 2 hours if unresolved. Do not exceed 30 mg in 24 hours. 9 tablet 2     No current facility-administered medications for this visit.       PAST MEDICAL HISTORY:  Patient Active Problem List   Diagnosis    Attention deficit    Femoroacetabular impingement of left hip    Flexural eczema    Vitamin D deficiency    Multiple thyroid nodules    Iron deficiency anemia    Generalized anxiety disorder    GERD without esophagitis    Migraine with aura and without status migrainosus, not intractable       PAST SURGICAL HISTORY:  Past Surgical History:   Procedure Laterality Date     SECTION, LOW TRANSVERSE      OTHER SURGICAL HISTORY  2019    Hip surgery       FAMILY HISTORY:  No family history on file.    SOCIAL HISTORY:  Social History     Socioeconomic History    Marital status:      Spouse name: Not on file    Number of children: Not on file    Years of education: Not on file    Highest education level: Not on file   Occupational History    Not on file   Tobacco Use    Smoking status: Never    Smokeless tobacco: Never   Substance and  Sexual Activity    Alcohol use: Yes    Drug use: Never    Sexual activity: Not on file   Other Topics Concern    Not on file   Social History Narrative    Not on file     Social Determinants of Health     Financial Resource Strain: Not on file   Food Insecurity: Not on file   Transportation Needs: Not on file   Physical Activity: Not on file   Stress: Not on file   Social Connections: Not on file   Intimate Partner Violence: Not on file   Housing Stability: Not on file       ALLERGIES:  Allergies   Allergen Reactions    Azithromycin Anaphylaxis    Sertraline Anaphylaxis    Penicillin Unknown    Prednisolone Unknown    Shellfish Containing Products Hives       REVIEW OF SYSTEMS:  GENERAL: Negative for malaise, significant weight loss and fever  NECK: Negative for lumps, goiter, pain and significant neck swelling  RESPIRATORY: Negative for cough, wheezing or shortness of breath.  CARDIOVASCULAR: Negative for chest pain, leg swelling or palpitations.  GI: Negative for abdominal discomfort, blood in stools or black stools or change in bowel habits  : No history of dysuria, frequency or incontinence  MUSCULOSKELETAL: Negative for joint pain or swelling, back pain or muscle pain.  SKIN: Negative for lesions, rash, and itching.  PSYCH: Negative for sleep disturbance, mood disorder and recent psychosocial stressors.  ENDOCRINE: Negative for cold or heat intolerance, polyuria, polydipsia and goiter.    PHYSICAL EXAM:  Visit Vitals  /76   Pulse 86     General appearance:   Skin: warm, no erythema or rashes  Lungs: clear to percussion and auscultation  Heart: regular rhythm and S1, S2 normal  Abdomen: soft, non-tender, no masses, no organomegaly  Extremities: Normal exam of the extremities. No swelling or pain.  Neck: supple with no nodes      IMPRESSION:  Geno Julien is a 24 y.o. female with ongoing GERD. This started with her firswt pregnancy. And sleeps with HOB elevated.  She has GERD QOL of 34 despite  bid ppi and tums multiple times daily.  She notes minimal relief with these as well as some dysphagia.  We will work up to further evaluate.  She is now fearful to eat and scared whe will choke.  We discussed possible dilation at time of endoscopy to help her symptomatically temporarily  This patient is having ongoing symptoms despite PPI use and optimal medical management. We will work up with esophagram, UGI, EGD, pH study and manometry.  Then we can discuss the treatment options.  We briefly discussed surgical options with hiatal hernia repair and possible plication versus magnetic sphincter augmentation.  We also discussed endoscopic approaches with JOSE and Hanna.  The patient will follow up after the studies are complete.    PLAN:  Avoid chocolate, peppermint and caffeine  Sleep with hob elevated  Do not eat within 2 hours of laying down.   UGI/Esophagram with views of the stomach-Schedule this in fluoroscopy in radiology at your convenience  EGD with pH probe-For this you will need a .  You will need to stop your omeprazole 7 days prior and switch to pepcid..  Stop the pepcid 4 days prior and then use tums until the day before.  When you have the probe in place, be sure to eat and do things that bring on your symptoms to have a provocative study.  We will possibly do a stretching to help you eat as well.   Esophageal Manometry-This study is for us to learn how your esophagus works.  It will take about 15 minutes and you will have 15 swallows of salt water.  It is a little uncomfortable but not painful and gives us a lot of information  Schedule at follow up after the studies are complete.       Dr. Angelita Emery M.D., MPH  Director of Bariatric & Minimally Invasive Surgery  [address] [address]  T:  [phone]  F:  [phone]    Ansley Cooley, RN Assistant Nurse Manager, Care Coordinator - Bariatric/General Surgery [facility] Patient  Nursing Contact  T:  [phone]  F:  [phone]

## 2024-09-23 ENCOUNTER — NURSE ONLY (OUTPATIENT)
Dept: SURGERY | Facility: CLINIC | Age: 24
End: 2024-09-23

## 2024-09-23 ENCOUNTER — APPOINTMENT (OUTPATIENT)
Dept: SURGERY | Facility: CLINIC | Age: 24
End: 2024-09-23
Payer: COMMERCIAL

## 2024-09-23 VITALS
HEIGHT: 62 IN | HEART RATE: 86 BPM | DIASTOLIC BLOOD PRESSURE: 76 MMHG | WEIGHT: 126 LBS | BODY MASS INDEX: 23.19 KG/M2 | SYSTOLIC BLOOD PRESSURE: 128 MMHG

## 2024-09-23 DIAGNOSIS — K21.9 GERD WITHOUT ESOPHAGITIS: ICD-10-CM

## 2024-09-23 DIAGNOSIS — R13.19 ESOPHAGEAL DYSPHAGIA: Primary | ICD-10-CM

## 2024-09-23 PROCEDURE — 3008F BODY MASS INDEX DOCD: CPT | Performed by: SURGERY

## 2024-09-23 PROCEDURE — 99205 OFFICE O/P NEW HI 60 MIN: CPT | Performed by: SURGERY

## 2024-09-23 NOTE — PROGRESS NOTES
Clinic Clean Up 9/23/24  Orders Placed Manometry, Esophagram, UGI, BRAVO, EGD Dilation   FUV  after testing complete

## 2024-09-23 NOTE — PATIENT INSTRUCTIONS
PLAN:  Avoid chocolate, peppermint and caffeine  Sleep with hob elevated  Do not eat within 2 hours of laying down.   UGI/Esophagram with views of the stomach-Schedule this in fluoroscopy in radiology at your convenience  EGD with pH probe-For this you will need a .  You will need to stop your omeprazole 7 days prior and switch to pepcid..  Stop the pepcid 4 days prior and then use tums until the day before.  When you have the probe in place, be sure to eat and do things that bring on your symptoms to have a provocative study.  We will possibly do a stretching to help you eat as well.   Esophageal Manometry-This study is for us to learn how your esophagus works.  It will take about 15 minutes and you will have 15 swallows of salt water.  It is a little uncomfortable but not painful and gives us a lot of information  Schedule at follow up after the studies are complete.       Dr. Angelita Emery M.D., MPH  Director of Bariatric & Minimally Invasive Surgery  [place] [address]  T:  [phone]  F:  [phone]    Ansley Cooley, RN Assistant Nurse Manager, Care Coordinator - Bariatric/General Surgery [facility] Patient Nursing Contact  T:  [phone]  F:  [phone]

## 2024-09-24 ENCOUNTER — TELEPHONE (OUTPATIENT)
Dept: SURGERY | Facility: CLINIC | Age: 24
End: 2024-09-24
Payer: COMMERCIAL

## 2024-09-24 NOTE — TELEPHONE ENCOUNTER
I attempted to call patient this morning. They saw Dr. Emery yesterday 9/23 she had ordered testing for the patient. I then reached out to the patient LVM about why I was calling and left the office phone to call back.

## 2024-10-07 ENCOUNTER — E-VISIT (OUTPATIENT)
Dept: PRIMARY CARE | Facility: CLINIC | Age: 24
End: 2024-10-07
Payer: COMMERCIAL

## 2024-10-08 ENCOUNTER — TELEPHONE (OUTPATIENT)
Dept: SURGERY | Facility: CLINIC | Age: 24
End: 2024-10-08
Payer: COMMERCIAL

## 2024-10-12 ENCOUNTER — APPOINTMENT (OUTPATIENT)
Dept: RADIOLOGY | Facility: HOSPITAL | Age: 24
End: 2024-10-12
Payer: COMMERCIAL

## 2024-10-12 ENCOUNTER — HOSPITAL ENCOUNTER (EMERGENCY)
Facility: HOSPITAL | Age: 24
Discharge: HOME | End: 2024-10-12
Attending: STUDENT IN AN ORGANIZED HEALTH CARE EDUCATION/TRAINING PROGRAM
Payer: COMMERCIAL

## 2024-10-12 VITALS
HEIGHT: 62 IN | TEMPERATURE: 99.7 F | DIASTOLIC BLOOD PRESSURE: 81 MMHG | HEART RATE: 101 BPM | WEIGHT: 122 LBS | OXYGEN SATURATION: 99 % | BODY MASS INDEX: 22.45 KG/M2 | RESPIRATION RATE: 18 BRPM | SYSTOLIC BLOOD PRESSURE: 130 MMHG

## 2024-10-12 DIAGNOSIS — O26.899 ABDOMINAL PAIN AFFECTING PREGNANCY (HHS-HCC): Primary | ICD-10-CM

## 2024-10-12 DIAGNOSIS — N30.90 CYSTITIS: ICD-10-CM

## 2024-10-12 DIAGNOSIS — R10.9 ABDOMINAL PAIN AFFECTING PREGNANCY (HHS-HCC): Primary | ICD-10-CM

## 2024-10-12 LAB
ABO GROUP (TYPE) IN BLOOD: NORMAL
ALBUMIN SERPL BCP-MCNC: 4.6 G/DL (ref 3.4–5)
ALP SERPL-CCNC: 34 U/L (ref 33–110)
ALT SERPL W P-5'-P-CCNC: 10 U/L (ref 7–45)
ANION GAP SERPL CALC-SCNC: 11 MMOL/L (ref 10–20)
ANTIBODY SCREEN: NORMAL
APPEARANCE UR: ABNORMAL
AST SERPL W P-5'-P-CCNC: 11 U/L (ref 9–39)
B-HCG SERPL-ACNC: 74 MIU/ML
BASOPHILS # BLD AUTO: 0.04 X10*3/UL (ref 0–0.1)
BASOPHILS NFR BLD AUTO: 0.4 %
BILIRUB SERPL-MCNC: 0.2 MG/DL (ref 0–1.2)
BILIRUB UR STRIP.AUTO-MCNC: NEGATIVE MG/DL
BUN SERPL-MCNC: 6 MG/DL (ref 6–23)
CALCIUM SERPL-MCNC: 9.2 MG/DL (ref 8.6–10.3)
CHLORIDE SERPL-SCNC: 107 MMOL/L (ref 98–107)
CO2 SERPL-SCNC: 26 MMOL/L (ref 21–32)
COLOR UR: COLORLESS
CREAT SERPL-MCNC: 0.59 MG/DL (ref 0.5–1.05)
EGFRCR SERPLBLD CKD-EPI 2021: >90 ML/MIN/1.73M*2
EOSINOPHIL # BLD AUTO: 0.22 X10*3/UL (ref 0–0.7)
EOSINOPHIL NFR BLD AUTO: 2.3 %
ERYTHROCYTE [DISTWIDTH] IN BLOOD BY AUTOMATED COUNT: 12.9 % (ref 11.5–14.5)
GLUCOSE SERPL-MCNC: 104 MG/DL (ref 74–99)
GLUCOSE UR STRIP.AUTO-MCNC: NORMAL MG/DL
HCG UR QL IA.RAPID: NEGATIVE
HCT VFR BLD AUTO: 39.8 % (ref 36–46)
HGB BLD-MCNC: 13.3 G/DL (ref 12–16)
IMM GRANULOCYTES # BLD AUTO: 0.02 X10*3/UL (ref 0–0.7)
IMM GRANULOCYTES NFR BLD AUTO: 0.2 % (ref 0–0.9)
KETONES UR STRIP.AUTO-MCNC: NEGATIVE MG/DL
LEUKOCYTE ESTERASE UR QL STRIP.AUTO: ABNORMAL
LYMPHOCYTES # BLD AUTO: 2.18 X10*3/UL (ref 1.2–4.8)
LYMPHOCYTES NFR BLD AUTO: 22.6 %
MCH RBC QN AUTO: 29.4 PG (ref 26–34)
MCHC RBC AUTO-ENTMCNC: 33.4 G/DL (ref 32–36)
MCV RBC AUTO: 88 FL (ref 80–100)
MONOCYTES # BLD AUTO: 0.58 X10*3/UL (ref 0.1–1)
MONOCYTES NFR BLD AUTO: 6 %
NEUTROPHILS # BLD AUTO: 6.6 X10*3/UL (ref 1.2–7.7)
NEUTROPHILS NFR BLD AUTO: 68.5 %
NITRITE UR QL STRIP.AUTO: NEGATIVE
NRBC BLD-RTO: 0 /100 WBCS (ref 0–0)
PH UR STRIP.AUTO: 6 [PH]
PLATELET # BLD AUTO: 350 X10*3/UL (ref 150–450)
POTASSIUM SERPL-SCNC: 4.1 MMOL/L (ref 3.5–5.3)
PROT SERPL-MCNC: 7.2 G/DL (ref 6.4–8.2)
PROT UR STRIP.AUTO-MCNC: ABNORMAL MG/DL
RBC # BLD AUTO: 4.52 X10*6/UL (ref 4–5.2)
RBC # UR STRIP.AUTO: ABNORMAL /UL
RBC #/AREA URNS AUTO: >20 /HPF
RH FACTOR (ANTIGEN D): NORMAL
SODIUM SERPL-SCNC: 140 MMOL/L (ref 136–145)
SP GR UR STRIP.AUTO: 1
SQUAMOUS #/AREA URNS AUTO: ABNORMAL /HPF
UROBILINOGEN UR STRIP.AUTO-MCNC: NORMAL MG/DL
WBC # BLD AUTO: 9.6 X10*3/UL (ref 4.4–11.3)
WBC #/AREA URNS AUTO: ABNORMAL /HPF

## 2024-10-12 PROCEDURE — 81025 URINE PREGNANCY TEST: CPT | Performed by: STUDENT IN AN ORGANIZED HEALTH CARE EDUCATION/TRAINING PROGRAM

## 2024-10-12 PROCEDURE — 85025 COMPLETE CBC W/AUTO DIFF WBC: CPT | Performed by: STUDENT IN AN ORGANIZED HEALTH CARE EDUCATION/TRAINING PROGRAM

## 2024-10-12 PROCEDURE — 84075 ASSAY ALKALINE PHOSPHATASE: CPT | Performed by: STUDENT IN AN ORGANIZED HEALTH CARE EDUCATION/TRAINING PROGRAM

## 2024-10-12 PROCEDURE — 84702 CHORIONIC GONADOTROPIN TEST: CPT | Performed by: STUDENT IN AN ORGANIZED HEALTH CARE EDUCATION/TRAINING PROGRAM

## 2024-10-12 PROCEDURE — 86901 BLOOD TYPING SEROLOGIC RH(D): CPT | Performed by: STUDENT IN AN ORGANIZED HEALTH CARE EDUCATION/TRAINING PROGRAM

## 2024-10-12 PROCEDURE — 81003 URINALYSIS AUTO W/O SCOPE: CPT | Performed by: STUDENT IN AN ORGANIZED HEALTH CARE EDUCATION/TRAINING PROGRAM

## 2024-10-12 PROCEDURE — 76801 OB US < 14 WKS SINGLE FETUS: CPT

## 2024-10-12 PROCEDURE — 80053 COMPREHEN METABOLIC PANEL: CPT | Performed by: STUDENT IN AN ORGANIZED HEALTH CARE EDUCATION/TRAINING PROGRAM

## 2024-10-12 PROCEDURE — 36415 COLL VENOUS BLD VENIPUNCTURE: CPT | Performed by: STUDENT IN AN ORGANIZED HEALTH CARE EDUCATION/TRAINING PROGRAM

## 2024-10-12 PROCEDURE — 99284 EMERGENCY DEPT VISIT MOD MDM: CPT | Mod: 25 | Performed by: STUDENT IN AN ORGANIZED HEALTH CARE EDUCATION/TRAINING PROGRAM

## 2024-10-12 RX ORDER — CEPHALEXIN 125 MG/5ML
500 POWDER, FOR SUSPENSION ORAL 4 TIMES DAILY
Qty: 560 ML | Refills: 0 | Status: SHIPPED | OUTPATIENT
Start: 2024-10-12 | End: 2024-10-19

## 2024-10-12 RX ORDER — CEPHALEXIN 500 MG/1
500 CAPSULE ORAL 4 TIMES DAILY
Qty: 28 CAPSULE | Refills: 0 | Status: SHIPPED | OUTPATIENT
Start: 2024-10-12 | End: 2024-10-12 | Stop reason: WASHOUT

## 2024-10-12 ASSESSMENT — COLUMBIA-SUICIDE SEVERITY RATING SCALE - C-SSRS
6. HAVE YOU EVER DONE ANYTHING, STARTED TO DO ANYTHING, OR PREPARED TO DO ANYTHING TO END YOUR LIFE?: NO
2. HAVE YOU ACTUALLY HAD ANY THOUGHTS OF KILLING YOURSELF?: NO
1. IN THE PAST MONTH, HAVE YOU WISHED YOU WERE DEAD OR WISHED YOU COULD GO TO SLEEP AND NOT WAKE UP?: NO

## 2024-10-12 ASSESSMENT — LIFESTYLE VARIABLES
EVER FELT BAD OR GUILTY ABOUT YOUR DRINKING: NO
HAVE YOU EVER FELT YOU SHOULD CUT DOWN ON YOUR DRINKING: NO
TOTAL SCORE: 0
HAVE PEOPLE ANNOYED YOU BY CRITICIZING YOUR DRINKING: NO
EVER HAD A DRINK FIRST THING IN THE MORNING TO STEADY YOUR NERVES TO GET RID OF A HANGOVER: NO

## 2024-10-12 ASSESSMENT — PAIN - FUNCTIONAL ASSESSMENT: PAIN_FUNCTIONAL_ASSESSMENT: 0-10

## 2024-10-12 ASSESSMENT — PAIN DESCRIPTION - LOCATION: LOCATION: ABDOMEN

## 2024-10-12 ASSESSMENT — PAIN SCALES - GENERAL: PAINLEVEL_OUTOF10: 4

## 2024-10-12 NOTE — ED PROVIDER NOTES
History/Exam limitations: none  HPI was provided by patient    HPI:    Chief Complaint   Patient presents with    Vaginal Bleeding - Pregnant     6 weeks        Geno Julien is a 24 y.o. female presents with chief complaint of vaginal bleeding during pregnancy.  Is a G4, P3.  Has not had a confirmed noon ultrasound.  Believes she is 6 weeks pregnant.  Has some lower abdominal cramping.  Noticed spotting but only when she uses the restroom urinating.  States that just started prior to arrival.  Denies any dizziness lightheadedness numbness tingling weakness.       ROS: All other review of systems are negative except as noted above and HPI or ROS.   CONSTITUTIONAL: fever, chills  ENT: sore throat, congestion, rhinorrhea  CARDIOVASCULAR: chest pain, palpitations, swelling  RESPIRATORY: cough, wheeze, shortness of breath  GI: nausea, vomiting, diarrhea, abdominal pain,  black or tarry stools, constipation  GENITOURINARY: dysuria, hematuria, frequency, vaginal bleeding, vaginal discharge, suprapubic/pelvic pain  MUSCULOSKELETAL: deformity, neck pain  SKIN: rash, lesion  NEUROLOGIC: headache, numbness, focal weakness  NOTES: All systems reviewed, negative except as described above       Physical Exam:  GENERAL: Alert, oriented , cooperative,  in no acute distress.    HEAD: normocephalic, atraumatic    SKIN:  dry skin, no lesions.    EYES: PERRL, EOMs intact,  Conjunctiva pink with no erythema or exudates. No scleral icterus.     ENT: No external deformities. Nares patent, mucus membranes moist.  Pharynx clear, uvula midline.     NECK: Supple, without meningismus. Trachea at midline. No lymphadenopathy.    PULMONARY: Clear bilaterally. No crackles, rhonchi, wheezing.  No respiratory distress.  No work of breathing.    CARDIAC: Regular rate and regular rhythm.  Pulses 2+ in radials and dorsal pedal pulses bilaterally.  No murmur, rub, gallop.  No edema.    ABDOMEN: Soft, nontender, active bowel sounds.  No  palpable organomegaly.  No rebound or guarding.  No CVA tenderness.  No pulsatile masses.  Negative Elizalde sign, negative McBurney point    : Patient declined exam understanding limitations of not being performed    MUSCULOSKELETAL: Full range of motion throughout, no deformity.     NEUROLOGICAL:  CN II through XII are grossly intact, no focal neuro deficits.  Strength 5 out of 5 throughout bilateral upper and lower extremities neurovascular intact in bilateral upper and lower extremities    PSYCHIATRIC: Appropriate mood and affect. Calm.       MDM/ED COURSE:      The patient presented for evaluation of vaginal bleeding.  Differential included but not limited to pregnancy, fibroids, abnormal uterine bleeding. Postpartum amniotic fluid embolism, eclampsia, help syndrome, cardiomyopathy, ACS, PE, preeclampsia, uterine rupture, retained products, mastitis, chorioamnionitis   Patient was given  Lab work and imaging were performed    The patient has stable vs and will be discharge home.   The patient isin agreement with the plan and given instructions to follow up with OB/GYN        Imaging studies if performed were independently reviewed and interpreted by myself and confirmed by radiologist.        External Records Reviewed: I reviewed recent and relevant outside records          I discussed the differential, results and plan with the patient and/or family/friend/caregiver if present.       I emphasized the importance of follow-up with the physician I referred them to in the timeframe recommended.  I explained reasons for the patient to return to the Emergency Department. Additional verbal discharge instructions were also given and discussed with the patient to supplement those generated by the EMR. We also discussed medications that were prescribed (if any) including common side effects and interactions. The patient was advised to abstain from driving, operating heavy machinery or making significant decisions while  taking medications such as opiates and muscle relaxers that may impair this. All questions were addressed.  They understand return precautions and discharge instructions. The patient and/or family/friend/caregiver expressed understanding.     Note: This note was dictated by speech recognition. Minor errors in transcription may be present.    ED Course as of 10/12/24 0519   Sat Oct 12, 2024   0429 US PELVIS OB TRANSABDOMINAL W TRANSVAGINAL UP TO 1ST TRIMESTER  No evidence of intrauterine pregnancy identified at this time. In the  setting of a positive beta HCG, this represents a pregnancy of  unknown location (i.e., intrauterine pregnancy of indeterminate  viability that is too early to visualize OR ectopic pregnancy). There  is some thickening to the right adnexal structures but this is of  indeterminate significance or origin. There is also a cystic right  adnexal mass slightly eccentric to the right ovary with rim  vascularity that could represent corpus luteum or ectopic pregnancy.  Recommend obstetrical consultation, serial beta HCG measurements, and  repeat ultrasound as clinically indicated.      Thickened endometrial reaction.   [WL]   0437 m [WL]   0437 Leukocyte Esterase, Urine(!): 500 Mata/µL [WL]   0437 RBC, Urine(!): >20 [WL]   0437 WBC, Urine(!): 21-50 [WL]   0437 HCG, Beta-Quantitative(!): 74  Patient EKG interpreted by me shows 7 [WL]   0439 Given findings on ultrasound at this point is so early but cannot still rule out ectopic.  Patient has no peritoneal abdomen here and after talking with Dr. De León for OB recommends discharge and follow-up on Monday with her OB for serial hCG levels.  Was also given strict return precautions.  Found to have urinary tract infection here for which we gave Keflex and prescription to go home with. [WL]   0451 On reevaluation did states she had some cramping in her left back otherwise none presently.  She is comfortable with the plan. [WL]      ED Course User  Index  [WL] Rajeev TRAVIS Casiano DO         Diagnoses as of 10/12/24 0519   Abdominal pain affecting pregnancy ([facility])   Cystitis         Past Medical History:   Diagnosis Date    Pain in unspecified hip 02/18/2019    Hip pain      Social History     Socioeconomic History    Marital status:    Tobacco Use    Smoking status: Never    Smokeless tobacco: Never   Substance and Sexual Activity    Alcohol use: Yes    Drug use: Never     Current Outpatient Medications   Medication Instructions    busPIRone (Buspar) 10 mg tablet 1 tablet, oral, 2 times daily, For 30 days     cephalexin (KEFLEX) 500 mg, oral, 4 times daily    escitalopram (LEXAPRO) 5 mg, oral, Daily    ferrous sulfate 325 (65 Fe) MG tablet 2 tablets, oral, Every other day    omeprazole (PRILOSEC) 20 mg, oral, 2 times daily, For 90 days    PNV no.95/ferrous fum/folic ac (PRENATAL ORAL) 1 tablet, oral, Daily    rimegepant (NURTEC ODT) 75 mg, oral, Every other day    rizatriptan MLT (MAXALT-MLT) 10 mg, oral, Once as needed, May repeat in 2 hours if unresolved. Do not exceed 30 mg in 24 hours.     Allergies   Allergen Reactions    Azithromycin Anaphylaxis    Sertraline Anaphylaxis    Penicillin Unknown    Prednisolone Unknown    Shellfish Containing Products Hives             ED Triage Vitals   Temperature Heart Rate Respirations BP   10/12/24 0108 10/12/24 0109 10/12/24 0109 10/12/24 0109   37.6 °C (99.7 °F) (!) 101 18 130/81      Pulse Ox Temp src Heart Rate Source Patient Position   10/12/24 0109 -- -- --   99 %         BP Location FiO2 (%)     -- --                     Labs and Imaging  US PELVIS OB TRANSABDOMINAL W TRANSVAGINAL UP TO 1ST TRIMESTER   Final Result   No evidence of intrauterine pregnancy identified at this time. In the   setting of a positive beta HCG, this represents a pregnancy of   unknown location (i.e., intrauterine pregnancy of indeterminate   viability that is too early to visualize OR ectopic pregnancy). There   is some thickening  to the right adnexal structures but this is of   indeterminate significance or origin. There is also a cystic right   adnexal mass slightly eccentric to the right ovary with rim   vascularity that could represent corpus luteum or ectopic pregnancy.   Recommend obstetrical consultation, serial beta HCG measurements, and   repeat ultrasound as clinically indicated.        Thickened endometrial reaction.             MACRO:   None.        Signed by: Layo Ku 10/12/2024 3:43 AM   Dictation workstation:   [id number]        Labs Reviewed   COMPREHENSIVE METABOLIC PANEL - Abnormal       Result Value    Glucose 104 (*)     Sodium 140      Potassium 4.1      Chloride 107      Bicarbonate 26      Anion Gap 11      Urea Nitrogen 6      Creatinine 0.59      eGFR >90      Calcium 9.2      Albumin 4.6      Alkaline Phosphatase 34      Total Protein 7.2      AST 11      Bilirubin, Total 0.2      ALT 10     HUMAN CHORIONIC GONADOTROPIN, SERUM QUANTITATIVE - Abnormal    HCG, Beta-Quantitative 74 (*)     Narrative:      Total HCG measurement is performed using the [employer] Access   Immunoassay which detects intact HCG and free beta HCG subunit.    This test is not indicated for use as a tumor marker.   HCG testing is performed using a different test methodology at [facility] than other [facility]. Direct result comparison   should only be made within the same method.       URINALYSIS WITH REFLEX MICROSCOPIC - Abnormal    Color, Urine Colorless (*)     Appearance, Urine Turbid (*)     Specific Gravity, Urine 1.003 (*)     pH, Urine 6.0      Protein, Urine 30 (1+) (*)     Glucose, Urine Normal      Blood, Urine OVER (3+) (*)     Ketones, Urine NEGATIVE      Bilirubin, Urine NEGATIVE      Urobilinogen, Urine Normal      Nitrite, Urine NEGATIVE      Leukocyte Esterase, Urine 500 Mata/µL (*)     Narrative:     OVER is reported when the result is greater than the clinically reportable range.    MICROSCOPIC ONLY, URINE - Abnormal    WBC, Urine 21-50 (*)     RBC, Urine >20 (*)     Squamous Epithelial Cells, Urine 1-9 (SPARSE)     HCG, URINE, QUALITATIVE - Normal    HCG, Urine NEGATIVE     CBC WITH AUTO DIFFERENTIAL    WBC 9.6      nRBC 0.0      RBC 4.52      Hemoglobin 13.3      Hematocrit 39.8      MCV 88      MCH 29.4      MCHC 33.4      RDW 12.9      Platelets 350      Neutrophils % 68.5      Immature Granulocytes %, Automated 0.2      Lymphocytes % 22.6      Monocytes % 6.0      Eosinophils % 2.3      Basophils % 0.4      Neutrophils Absolute 6.60      Immature Granulocytes Absolute, Automated 0.02      Lymphocytes Absolute 2.18      Monocytes Absolute 0.58      Eosinophils Absolute 0.22      Basophils Absolute 0.04     TYPE AND SCREEN    ABO TYPE A      Rh TYPE NEG      ANTIBODY SCREEN NEG      Narrative:     Review your Rh Negative female patient's potential need for Rh Immune Globulin (RhIg)administration.           Procedure  Procedures                  Rajeev Casiano DO  10/12/24 0519

## 2024-10-12 NOTE — ED TRIAGE NOTES
Pt started light vaginal bleeding couple hours ago like a light period.  Has gotten worse with passing clots.  Has not needed to change pad in last hour.  Pt is 6 weeks pain.  Having abdominal cramping, similar to period cramping.  Rates the discomfort 4/10.  Denies dizziness/cp/sob.  Speaking in complete sentences.  Walked into triage with steady gait.

## 2024-10-14 ENCOUNTER — LAB (OUTPATIENT)
Dept: LAB | Facility: LAB | Age: 24
End: 2024-10-14
Payer: COMMERCIAL

## 2024-10-14 ENCOUNTER — TELEPHONE (OUTPATIENT)
Dept: SURGERY | Facility: CLINIC | Age: 24
End: 2024-10-14
Payer: COMMERCIAL

## 2024-10-14 DIAGNOSIS — O20.0 THREATENED ABORTION (HHS-HCC): ICD-10-CM

## 2024-10-14 LAB — B-HCG SERPL-ACNC: 19 MIU/ML

## 2024-10-14 PROCEDURE — 36415 COLL VENOUS BLD VENIPUNCTURE: CPT

## 2024-10-14 PROCEDURE — 84702 CHORIONIC GONADOTROPIN TEST: CPT

## 2024-10-14 NOTE — TELEPHONE ENCOUNTER
Is on our patient list to schedule EGD w/ Dilation & Bravo. Presented to ER 10/12 for Pelvic pain & possible Pregnancy. I did not call to [facility] appt. I will continue to monitor

## 2024-10-17 ENCOUNTER — LAB (OUTPATIENT)
Dept: LAB | Facility: LAB | Age: 24
End: 2024-10-17
Payer: COMMERCIAL

## 2024-10-17 DIAGNOSIS — O20.0 THREATENED ABORTION (HHS-HCC): ICD-10-CM

## 2024-10-17 LAB — B-HCG SERPL-ACNC: 7 MIU/ML

## 2024-10-17 PROCEDURE — 36415 COLL VENOUS BLD VENIPUNCTURE: CPT

## 2024-10-17 PROCEDURE — 84702 CHORIONIC GONADOTROPIN TEST: CPT

## 2024-10-21 ENCOUNTER — TELEPHONE (OUTPATIENT)
Dept: SURGERY | Facility: CLINIC | Age: 24
End: 2024-10-21

## 2024-10-21 ENCOUNTER — LAB (OUTPATIENT)
Dept: LAB | Facility: LAB | Age: 24
End: 2024-10-21
Payer: COMMERCIAL

## 2024-10-21 DIAGNOSIS — O20.0 THREATENED ABORTION (HHS-HCC): ICD-10-CM

## 2024-10-21 LAB — B-HCG SERPL-ACNC: 2 MIU/ML

## 2024-10-21 PROCEDURE — 36415 COLL VENOUS BLD VENIPUNCTURE: CPT

## 2024-10-21 PROCEDURE — 84702 CHORIONIC GONADOTROPIN TEST: CPT

## 2024-10-21 NOTE — TELEPHONE ENCOUNTER
Called patient to schedule EGD, BRAVO & EMOT. Wants to see OB/GYN before proceeding. Has appt on 10/28. Will reach out once she sees that doctor.

## 2024-11-13 ENCOUNTER — TELEPHONE (OUTPATIENT)
Dept: SURGERY | Facility: CLINIC | Age: 24
End: 2024-11-13
Payer: COMMERCIAL

## 2024-11-13 NOTE — TELEPHONE ENCOUNTER
Reached out to patient. Last conversation 10/21, she needed a consult with OB/GYN to continue. When I spoke with her today, she said she was given the clearance to continue with procedures that are needed. Scheduled EGD CHENG/BRAVO on 01/03/25, Rancho GALICIAT 01/28/25. Also rancho FUV 2/10/25

## 2024-11-16 ENCOUNTER — LAB (OUTPATIENT)
Dept: LAB | Facility: LAB | Age: 24
End: 2024-11-16
Payer: COMMERCIAL

## 2024-11-16 DIAGNOSIS — Z32.01 POSITIVE PREGNANCY TEST (HHS-HCC): ICD-10-CM

## 2024-11-16 LAB — B-HCG SERPL-ACNC: 716 MIU/ML

## 2024-11-16 PROCEDURE — 84702 CHORIONIC GONADOTROPIN TEST: CPT

## 2024-11-18 ENCOUNTER — LAB (OUTPATIENT)
Dept: LAB | Facility: LAB | Age: 24
End: 2024-11-18
Payer: COMMERCIAL

## 2024-11-18 DIAGNOSIS — Z32.01 POSITIVE PREGNANCY TEST (HHS-HCC): ICD-10-CM

## 2024-11-18 LAB — B-HCG SERPL-ACNC: 2000 MIU/ML

## 2024-11-18 PROCEDURE — 36415 COLL VENOUS BLD VENIPUNCTURE: CPT

## 2024-11-18 PROCEDURE — 84702 CHORIONIC GONADOTROPIN TEST: CPT

## 2024-11-21 ENCOUNTER — PATIENT MESSAGE (OUTPATIENT)
Dept: PRIMARY CARE | Facility: CLINIC | Age: 24
End: 2024-11-21
Payer: COMMERCIAL

## 2024-11-21 DIAGNOSIS — K21.9 GERD WITHOUT ESOPHAGITIS: ICD-10-CM

## 2024-11-21 RX ORDER — OMEPRAZOLE 20 MG/1
20 CAPSULE, DELAYED RELEASE ORAL 2 TIMES DAILY
Qty: 180 CAPSULE | Refills: 1 | Status: SHIPPED | OUTPATIENT
Start: 2024-11-21 | End: 2024-11-24 | Stop reason: SDUPTHER

## 2024-11-24 RX ORDER — OMEPRAZOLE 20 MG/1
20 CAPSULE, DELAYED RELEASE ORAL 2 TIMES DAILY
Qty: 180 CAPSULE | Refills: 1 | Status: SHIPPED | OUTPATIENT
Start: 2024-11-24

## 2024-11-25 ENCOUNTER — APPOINTMENT (OUTPATIENT)
Dept: SURGERY | Facility: CLINIC | Age: 24
End: 2024-11-25
Payer: COMMERCIAL

## 2024-12-11 ENCOUNTER — TELEPHONE (OUTPATIENT)
Dept: SURGERY | Facility: CLINIC | Age: 24
End: 2024-12-11
Payer: COMMERCIAL

## 2024-12-11 NOTE — TELEPHONE ENCOUNTER
Patient has EGD procedure rivas 01/03/2025. Dr Emery will not be avail that day. Called patient to bk = [facility]

## 2024-12-11 NOTE — TELEPHONE ENCOUNTER
I called the patient earlier today to reschedule her EGD procedure scheduled on 01/03/2025. It was moved to 01/10/2025.  Patient called back to cancel all procedures as she just found out she is pregnant.

## 2024-12-18 ENCOUNTER — LAB REQUISITION (OUTPATIENT)
Dept: LAB | Facility: HOSPITAL | Age: 24
End: 2024-12-18
Payer: COMMERCIAL

## 2024-12-18 DIAGNOSIS — Z34.90 ENCOUNTER FOR SUPERVISION OF NORMAL PREGNANCY, UNSPECIFIED, UNSPECIFIED TRIMESTER: ICD-10-CM

## 2024-12-18 PROCEDURE — 87491 CHLMYD TRACH DNA AMP PROBE: CPT

## 2024-12-18 PROCEDURE — 87591 N.GONORRHOEAE DNA AMP PROB: CPT

## 2024-12-18 PROCEDURE — 87086 URINE CULTURE/COLONY COUNT: CPT

## 2024-12-19 LAB
BACTERIA UR CULT: NORMAL
C TRACH RRNA SPEC QL NAA+PROBE: NEGATIVE
N GONORRHOEA DNA SPEC QL PROBE+SIG AMP: NEGATIVE

## 2024-12-24 ENCOUNTER — OFFICE VISIT (OUTPATIENT)
Dept: PRIMARY CARE | Facility: CLINIC | Age: 24
End: 2024-12-24
Payer: COMMERCIAL

## 2024-12-24 VITALS
HEIGHT: 62 IN | TEMPERATURE: 97.8 F | RESPIRATION RATE: 18 BRPM | SYSTOLIC BLOOD PRESSURE: 100 MMHG | DIASTOLIC BLOOD PRESSURE: 70 MMHG | HEART RATE: 87 BPM | BODY MASS INDEX: 22.82 KG/M2 | WEIGHT: 124 LBS | OXYGEN SATURATION: 99 %

## 2024-12-24 DIAGNOSIS — F41.1 GENERALIZED ANXIETY DISORDER: ICD-10-CM

## 2024-12-24 DIAGNOSIS — Z3A.10: ICD-10-CM

## 2024-12-24 DIAGNOSIS — Z00.00 ROUTINE GENERAL MEDICAL EXAMINATION AT A HEALTH CARE FACILITY: Primary | ICD-10-CM

## 2024-12-24 PROCEDURE — 1036F TOBACCO NON-USER: CPT | Performed by: FAMILY MEDICINE

## 2024-12-24 PROCEDURE — 3008F BODY MASS INDEX DOCD: CPT | Performed by: FAMILY MEDICINE

## 2024-12-24 PROCEDURE — 99395 PREV VISIT EST AGE 18-39: CPT | Performed by: FAMILY MEDICINE

## 2024-12-24 ASSESSMENT — LIFESTYLE VARIABLES
HOW OFTEN DO YOU HAVE SIX OR MORE DRINKS ON ONE OCCASION: NEVER
SKIP TO QUESTIONS 9-10: 1
AUDIT-C TOTAL SCORE: 1
HOW MANY STANDARD DRINKS CONTAINING ALCOHOL DO YOU HAVE ON A TYPICAL DAY: 1 OR 2
HOW OFTEN DO YOU HAVE A DRINK CONTAINING ALCOHOL: MONTHLY OR LESS

## 2024-12-24 ASSESSMENT — PATIENT HEALTH QUESTIONNAIRE - PHQ9
SUM OF ALL RESPONSES TO PHQ9 QUESTIONS 1 & 2: 0
2. FEELING DOWN, DEPRESSED OR HOPELESS: NOT AT ALL
1. LITTLE INTEREST OR PLEASURE IN DOING THINGS: NOT AT ALL

## 2024-12-24 ASSESSMENT — PAIN SCALES - GENERAL: PAINLEVEL_OUTOF10: 0-NO PAIN

## 2024-12-24 ASSESSMENT — ENCOUNTER SYMPTOMS
OCCASIONAL FEELINGS OF UNSTEADINESS: 0
DEPRESSION: 0
LOSS OF SENSATION IN FEET: 0

## 2024-12-24 NOTE — PROGRESS NOTES
"Subjective   Patient ID: Geno Julien is a 24 y.o. female who presents for Annual Exam.    Here for annual physical     Headache:  -Type of headache: migraine with aura  -Symptoms: daily headache.  Not on medication due to pregnancy.    -Frequency: daily, worse with cold weather  -Associated symptoms: aura, light and sound sensitive  -Treatment: zolmitripan - no benefit.    -Specialist:  -Past Medications:         Cyproheptadine         Excedrin Migraine         Maxalt - sedation         Tylenol         Ibuprofen    Anxiety  -F/U: Pt recenlty had a miscarriage - pt went to [facility].  Pt had clots.  Pt saw Gyn.  Pt was 6 weeks.  Pt is now pregnant - 10 weeks.  Seeing Dr. Pizarro.  Pt has been nauseated.  Pt is not on iron.  Currently on buspar.  No lexapro.   -Symptoms have been not improved.   -She denies current suicidal and homicidal ideation.    -Current Treatment: buspar, celexa  -Counseling: not currently enrolled - limited time  -Previous treatment includes:        Wellbutrin         Sertraline        Prozac    GERD  -Using omeprazole twice a day - not improved.  No dysphagia.  Was scheduled for EGD - had to reschedule due to pregnancy.               Review of Systems    Objective   /70   Pulse 87   Temp 36.6 °C (97.8 °F) (Temporal)   Resp 18   Ht 1.575 m (5' 2\")   Wt 56.2 kg (124 lb)   SpO2 99%   BMI 22.68 kg/m²     Physical Exam  Vitals reviewed.   Constitutional:       General: She is not in acute distress.  Cardiovascular:      Rate and Rhythm: Normal rate and regular rhythm.   Pulmonary:      Effort: Pulmonary effort is normal.      Breath sounds: No wheezing or rhonchi.   Musculoskeletal:      Right lower leg: No edema.      Left lower leg: No edema.   Lymphadenopathy:      Cervical: No cervical adenopathy.   Neurological:      Mental Status: She is alert.         Assessment/Plan   Diagnoses and all orders for this visit:  Routine general medical examination at a [facility]" facility  Pregnancy with 10 completed weeks gestation ([facility])  Generalized anxiety disorder    There are no Patient Instructions on file for this visit.

## 2024-12-30 ENCOUNTER — LAB (OUTPATIENT)
Dept: LAB | Facility: LAB | Age: 24
End: 2024-12-30
Payer: COMMERCIAL

## 2024-12-30 DIAGNOSIS — Z34.90 ENCOUNTER FOR SUPERVISION OF NORMAL PREGNANCY, UNSPECIFIED, UNSPECIFIED TRIMESTER: Primary | ICD-10-CM

## 2024-12-30 DIAGNOSIS — Z34.81 ENCOUNTER FOR SUPERVISION OF OTHER NORMAL PREGNANCY, FIRST TRIMESTER: ICD-10-CM

## 2024-12-30 DIAGNOSIS — F41.1 GENERALIZED ANXIETY DISORDER: ICD-10-CM

## 2024-12-30 LAB
ABO GROUP (TYPE) IN BLOOD: NORMAL
ANTIBODY SCREEN: NORMAL
BASOPHILS # BLD AUTO: 0.03 X10*3/UL (ref 0–0.1)
BASOPHILS NFR BLD AUTO: 0.4 %
EOSINOPHIL # BLD AUTO: 0.07 X10*3/UL (ref 0–0.7)
EOSINOPHIL NFR BLD AUTO: 1 %
ERYTHROCYTE [DISTWIDTH] IN BLOOD BY AUTOMATED COUNT: 12.6 % (ref 11.5–14.5)
ERYTHROCYTE [DISTWIDTH] IN BLOOD BY AUTOMATED COUNT: 12.6 % (ref 11.5–14.5)
HBV SURFACE AG SERPL QL IA: NONREACTIVE
HCT VFR BLD AUTO: 38.3 % (ref 36–46)
HCT VFR BLD AUTO: 38.3 % (ref 36–46)
HCV AB SER QL: NONREACTIVE
HGB BLD-MCNC: 12.8 G/DL (ref 12–16)
HGB BLD-MCNC: 12.8 G/DL (ref 12–16)
HIV 1+2 AB+HIV1 P24 AG SERPL QL IA: NONREACTIVE
IMM GRANULOCYTES # BLD AUTO: 0.08 X10*3/UL (ref 0–0.7)
IMM GRANULOCYTES NFR BLD AUTO: 1.2 % (ref 0–0.9)
LYMPHOCYTES # BLD AUTO: 1.77 X10*3/UL (ref 1.2–4.8)
LYMPHOCYTES NFR BLD AUTO: 26.3 %
MCH RBC QN AUTO: 29.2 PG (ref 26–34)
MCH RBC QN AUTO: 29.2 PG (ref 26–34)
MCHC RBC AUTO-ENTMCNC: 33.4 G/DL (ref 32–36)
MCHC RBC AUTO-ENTMCNC: 33.4 G/DL (ref 32–36)
MCV RBC AUTO: 87 FL (ref 80–100)
MCV RBC AUTO: 87 FL (ref 80–100)
MONOCYTES # BLD AUTO: 0.43 X10*3/UL (ref 0.1–1)
MONOCYTES NFR BLD AUTO: 6.4 %
NEUTROPHILS # BLD AUTO: 4.34 X10*3/UL (ref 1.2–7.7)
NEUTROPHILS NFR BLD AUTO: 64.7 %
NRBC BLD-RTO: 0 /100 WBCS (ref 0–0)
NRBC BLD-RTO: 0 /100 WBCS (ref 0–0)
PLATELET # BLD AUTO: 314 X10*3/UL (ref 150–450)
PLATELET # BLD AUTO: 314 X10*3/UL (ref 150–450)
RBC # BLD AUTO: 4.38 X10*6/UL (ref 4–5.2)
RBC # BLD AUTO: 4.38 X10*6/UL (ref 4–5.2)
RBC MORPH BLD: NORMAL
REFLEX ADDED, ANEMIA PANEL: NORMAL
RH FACTOR (ANTIGEN D): NORMAL
TSH SERPL-ACNC: 0.86 MIU/L (ref 0.44–3.98)
WBC # BLD AUTO: 6.7 X10*3/UL (ref 4.4–11.3)
WBC # BLD AUTO: 6.7 X10*3/UL (ref 4.4–11.3)

## 2024-12-30 PROCEDURE — 86317 IMMUNOASSAY INFECTIOUS AGENT: CPT

## 2024-12-30 PROCEDURE — 86850 RBC ANTIBODY SCREEN: CPT

## 2024-12-30 PROCEDURE — 87340 HEPATITIS B SURFACE AG IA: CPT

## 2024-12-30 PROCEDURE — 87389 HIV-1 AG W/HIV-1&-2 AB AG IA: CPT

## 2024-12-30 PROCEDURE — 9990000009 MISCELLANEOUS GENETICS TEST

## 2024-12-30 PROCEDURE — 86803 HEPATITIS C AB TEST: CPT

## 2024-12-30 PROCEDURE — 85025 COMPLETE CBC W/AUTO DIFF WBC: CPT

## 2024-12-30 PROCEDURE — 86780 TREPONEMA PALLIDUM: CPT

## 2024-12-30 PROCEDURE — 84443 ASSAY THYROID STIM HORMONE: CPT

## 2024-12-30 PROCEDURE — 86901 BLOOD TYPING SEROLOGIC RH(D): CPT

## 2024-12-30 PROCEDURE — 86900 BLOOD TYPING SEROLOGIC ABO: CPT

## 2024-12-31 LAB
RUBV IGG SERPL IA-ACNC: 1 IA
RUBV IGG SERPL QL IA: POSITIVE
TREPONEMA PALLIDUM IGG+IGM AB [PRESENCE] IN SERUM OR PLASMA BY IMMUNOASSAY: NONREACTIVE

## 2025-01-03 ENCOUNTER — APPOINTMENT (OUTPATIENT)
Dept: OPERATING ROOM | Facility: HOSPITAL | Age: 25
End: 2025-01-03
Payer: COMMERCIAL

## 2025-01-17 LAB
COMMENTS - MP RESULT TYPE: NORMAL
SCAN RESULT: NORMAL

## 2025-01-20 ENCOUNTER — APPOINTMENT (OUTPATIENT)
Dept: SURGERY | Facility: CLINIC | Age: 25
End: 2025-01-20
Payer: COMMERCIAL

## 2025-01-21 ENCOUNTER — NURSE ONLY (OUTPATIENT)
Dept: SURGERY | Facility: CLINIC | Age: 25
End: 2025-01-21
Payer: COMMERCIAL

## 2025-01-21 NOTE — PROGRESS NOTES
1/21/25 Chart/Testing review    Manometry 1/28/24  Esophagram/UGI  BRAVO 1/3/24  EGD-Dialation 1/3/34  FUV - All appts canceled by patient

## 2025-02-10 ENCOUNTER — APPOINTMENT (OUTPATIENT)
Dept: SURGERY | Facility: CLINIC | Age: 25
End: 2025-02-10
Payer: COMMERCIAL

## 2025-02-12 ENCOUNTER — APPOINTMENT (OUTPATIENT)
Facility: CLINIC | Age: 25
End: 2025-02-12
Payer: COMMERCIAL

## 2025-02-12 VITALS — DIASTOLIC BLOOD PRESSURE: 68 MMHG | WEIGHT: 127 LBS | SYSTOLIC BLOOD PRESSURE: 122 MMHG | BODY MASS INDEX: 23.23 KG/M2

## 2025-02-12 DIAGNOSIS — Z34.80 SUPERVISION OF OTHER NORMAL PREGNANCY, ANTEPARTUM (HHS-HCC): Primary | Chronic | ICD-10-CM

## 2025-02-12 DIAGNOSIS — M25.852 FEMOROACETABULAR IMPINGEMENT OF LEFT HIP: ICD-10-CM

## 2025-02-12 DIAGNOSIS — R41.840 ATTENTION DEFICIT: ICD-10-CM

## 2025-02-12 DIAGNOSIS — O34.219 PREVIOUS CESAREAN SECTION COMPLICATING PREGNANCY (HHS-HCC): ICD-10-CM

## 2025-02-12 DIAGNOSIS — F41.1 GENERALIZED ANXIETY DISORDER: ICD-10-CM

## 2025-02-12 DIAGNOSIS — G43.109 MIGRAINE WITH AURA AND WITHOUT STATUS MIGRAINOSUS, NOT INTRACTABLE: ICD-10-CM

## 2025-02-12 PROBLEM — D50.9 IRON DEFICIENCY ANEMIA: Status: RESOLVED | Noted: 2023-09-01 | Resolved: 2025-02-12

## 2025-02-12 PROBLEM — E55.9 VITAMIN D DEFICIENCY: Status: RESOLVED | Noted: 2023-09-01 | Resolved: 2025-02-12

## 2025-02-12 PROCEDURE — 0501F PRENATAL FLOW SHEET: CPT | Performed by: OBSTETRICS & GYNECOLOGY

## 2025-02-12 ASSESSMENT — EDINBURGH POSTNATAL DEPRESSION SCALE (EPDS)
TOTAL SCORE: 0
I HAVE BEEN SO UNHAPPY THAT I HAVE HAD DIFFICULTY SLEEPING: NOT AT ALL
I HAVE BEEN ABLE TO LAUGH AND SEE THE FUNNY SIDE OF THINGS: AS MUCH AS I ALWAYS COULD
I HAVE BLAMED MYSELF UNNECESSARILY WHEN THINGS WENT WRONG: NO, NEVER
I HAVE BEEN ANXIOUS OR WORRIED FOR NO GOOD REASON: NO, NOT AT ALL
THE THOUGHT OF HARMING MYSELF HAS OCCURRED TO ME: NEVER
I HAVE BEEN SO UNHAPPY THAT I HAVE BEEN CRYING: NO, NEVER
I HAVE LOOKED FORWARD WITH ENJOYMENT TO THINGS: AS MUCH AS I EVER DID
THINGS HAVE BEEN GETTING ON TOP OF ME: NO, I HAVE BEEN COPING AS WELL AS EVER
I HAVE FELT SAD OR MISERABLE: NO, NOT AT ALL
I HAVE FELT SCARED OR PANICKY FOR NO GOOD REASON: NO, NOT AT ALL

## 2025-02-12 NOTE — PROGRESS NOTES
Assessment/Plan   Desired provider in labor: [] CNM  [] Physician   [] Either Acceptable  [x] Blood Products: [x] Yes, accepts [] No, needs counseling  [x] Initial BMI: 22.49   [x] Prenatal Labs:   [x] Cervical Cancer Screening up to date  [x] Rh status: A NEG  [x] Screen for IPV and Substance Use Risk:  [x] Genetic Screening (cfDNA):    [] First Trimester Anatomy Screen (11-13.6 wks):  [x] Baby ASA (initiated):  [] Pregnancy dated by:     [x] Anatomy US: (19-20 wks) scheduled  [] Federal Sterilization consent signed (if indicated):  [] 1hr GCT at 24-28wks:  [x] Rhogam (if indicated): needed  [] Fetal Surveillance (if indicated):  [] Tdap (27-32 wks, may be given up to 36 wks if initial window missed):   [] RSV (32-36 wks) (Sept. to end ):     [] Feeding Intentions:  [] Postpartum Birth control method:   [] GBS at 36 - 37 wks:  [] 39 weeks discussion of IOL vs. Expectant management:  [] Mode of delivery ( anticipated ):    Problem List       Attention deficit  Not currently on medication    Femoroacetabular impingement of left hip    Flexural eczema    Multiple thyroid nodules    Generalized anxiety disorder  Taking Buspar    GERD without esophagitis  On Omeprazole    Migraine with aura and without status migrainosus, not intractable                Previous  Section   Considering method of delivery    Reviewed lab results normal  Anatomy US scheduled  Patient desires TOLAC, risks/benefits of TOLAC vs. scheduled rCS discussed.    Reviewed s/sx of PTL, warning signs, fetal movement counts, and when to call provider  Follow up in 4 weeks for a routine prenatal visit.         Subjective     Genoabelino Julien is a 24 y.o.  at 16w6d with a working estimated date of delivery of 2025, by Ultrasound who presents for a routine prenatal visit.     Vaginal bleeding no  Leakage of fluid  no  Decreased fetal movements none  Contractions none      Postpartum Depression: Low Risk  (2025)     [place]  Depression Scale     Last EPDS Total Score: 0     Last EPDS Self Harm Result: Never       Prenatal Labs  Lab Results   Component Value Date    HGB 12.8 2024    HGB 12.8 2024    HCT 38.3 2024    HCT 38.3 2024     2024     2024    ABO A 2024    LABRH NEG 2024    ABID ANTI-D 2023    NEISSGONOAMP Negative 2024    CHLAMTRACAMP Negative 2024    SYPHT Nonreactive 2024    HEPBSAG Nonreactive 2024    HIV1X2 Nonreactive 2024    URINECULTURE  2024     Clinically insignificant growth based on current clinical standards.    GLUC1P 57 2023       Education provided:    Rd Pizarro MD

## 2025-02-21 ENCOUNTER — TELEPHONE (OUTPATIENT)
Dept: PRIMARY CARE | Facility: CLINIC | Age: 25
End: 2025-02-21
Payer: COMMERCIAL

## 2025-02-21 DIAGNOSIS — J01.90 ACUTE RHINOSINUSITIS: Primary | ICD-10-CM

## 2025-02-21 RX ORDER — AMOXICILLIN 400 MG/5ML
POWDER, FOR SUSPENSION ORAL
Qty: 140 ML | Refills: 0 | Status: SHIPPED | OUTPATIENT
Start: 2025-02-21

## 2025-02-21 RX ORDER — ONDANSETRON 4 MG/1
4 TABLET, ORALLY DISINTEGRATING ORAL EVERY 8 HOURS PRN
Qty: 30 TABLET | Refills: 1 | Status: SHIPPED | OUTPATIENT
Start: 2025-02-21 | End: 2025-02-28

## 2025-02-21 NOTE — TELEPHONE ENCOUNTER
Spoke with patietn.  Left cheek pain, pain behind eye and forehead.  Left ear pain.  Sx for 2 weeks.  Pt is 18 weeks pregnant.  Only uses liquid medications.  NO fevers.  No SOB.  Likely sinusitis.  Recommend amoxicillin twice a day.  For nausea - recommend vasquez first.  If no benefit then use zofran ODT.  PT will call if worsening.

## 2025-02-21 NOTE — TELEPHONE ENCOUNTER
Geno is calling stating she has had symptoms of sinus pain and pressure, ear pain on the right side now for 2 weeks. She went to [facility] in [place] and they turned her away because she is pregnant. She is calling for a appointment or for 's recommendations. Geno's # [phone]

## 2025-02-24 ENCOUNTER — PATIENT MESSAGE (OUTPATIENT)
Dept: PRIMARY CARE | Facility: CLINIC | Age: 25
End: 2025-02-24
Payer: COMMERCIAL

## 2025-02-24 DIAGNOSIS — K21.9 GERD WITHOUT ESOPHAGITIS: ICD-10-CM

## 2025-02-24 RX ORDER — OMEPRAZOLE 40 MG/1
40 CAPSULE, DELAYED RELEASE ORAL 2 TIMES DAILY
Qty: 60 CAPSULE | Refills: 2 | Status: SHIPPED | OUTPATIENT
Start: 2025-02-24

## 2025-02-28 ENCOUNTER — HOSPITAL ENCOUNTER (OUTPATIENT)
Dept: RADIOLOGY | Facility: CLINIC | Age: 25
Discharge: HOME | End: 2025-02-28
Payer: COMMERCIAL

## 2025-02-28 DIAGNOSIS — Z36.89 ENCOUNTER FOR FETAL ANATOMIC SURVEY (HHS-HCC): ICD-10-CM

## 2025-02-28 PROCEDURE — 76805 OB US >/= 14 WKS SNGL FETUS: CPT

## 2025-03-10 ENCOUNTER — APPOINTMENT (OUTPATIENT)
Dept: SURGERY | Facility: CLINIC | Age: 25
End: 2025-03-10
Payer: COMMERCIAL

## 2025-03-11 PROBLEM — Z3A.20 20 WEEKS GESTATION OF PREGNANCY (HHS-HCC): Status: ACTIVE | Noted: 2025-03-11

## 2025-03-12 ENCOUNTER — APPOINTMENT (OUTPATIENT)
Facility: CLINIC | Age: 25
End: 2025-03-12
Payer: COMMERCIAL

## 2025-03-12 VITALS — BODY MASS INDEX: 23.05 KG/M2 | WEIGHT: 126 LBS | SYSTOLIC BLOOD PRESSURE: 116 MMHG | DIASTOLIC BLOOD PRESSURE: 64 MMHG

## 2025-03-12 DIAGNOSIS — E04.2 MULTIPLE THYROID NODULES: Primary | ICD-10-CM

## 2025-03-12 DIAGNOSIS — M25.852 FEMOROACETABULAR IMPINGEMENT OF LEFT HIP: ICD-10-CM

## 2025-03-12 DIAGNOSIS — Z34.80 SUPERVISION OF OTHER NORMAL PREGNANCY, ANTEPARTUM (HHS-HCC): ICD-10-CM

## 2025-03-12 DIAGNOSIS — R41.840 ATTENTION DEFICIT: ICD-10-CM

## 2025-03-12 DIAGNOSIS — F41.1 GENERALIZED ANXIETY DISORDER: ICD-10-CM

## 2025-03-12 DIAGNOSIS — O34.219 PREVIOUS CESAREAN SECTION COMPLICATING PREGNANCY (HHS-HCC): ICD-10-CM

## 2025-03-12 DIAGNOSIS — G43.109 MIGRAINE WITH AURA AND WITHOUT STATUS MIGRAINOSUS, NOT INTRACTABLE: ICD-10-CM

## 2025-03-12 DIAGNOSIS — K21.9 GERD WITHOUT ESOPHAGITIS: ICD-10-CM

## 2025-03-12 DIAGNOSIS — Z3A.20 20 WEEKS GESTATION OF PREGNANCY (HHS-HCC): ICD-10-CM

## 2025-03-12 PROCEDURE — 0501F PRENATAL FLOW SHEET: CPT | Performed by: OBSTETRICS & GYNECOLOGY

## 2025-03-12 ASSESSMENT — PATIENT HEALTH QUESTIONNAIRE - PHQ9
2. FEELING DOWN, DEPRESSED OR HOPELESS: NOT AT ALL
1. LITTLE INTEREST OR PLEASURE IN DOING THINGS: NOT AT ALL
SUM OF ALL RESPONSES TO PHQ9 QUESTIONS 1 AND 2: 0

## 2025-03-12 NOTE — PROGRESS NOTES
Ob Visit  25     Objective   Physical Exam:   Weight: 57.2 kg (126 lb)  Pregravid BMI: 22.49  BP: 116/64  Fetal Heart Rate: 156 Fundal Height (cm): 21 cm Presentation: Breech           ASSESSMENT   Geno Julien is a 24 y.o.  at 20w5d with a working estimated date of delivery of 2025, by Ultrasound who presents for a routine prenatal visit.    The following concerns we addressed today:  Left sided hip pain, would like to see chiropractor  Reviewed anatomy scan.  F/up ACI    PLAN  -RTC in 4 weeks    Problem List Items Addressed This Visit       Attention deficit    Femoroacetabular impingement of left hip    Multiple thyroid nodules - Primary    Generalized anxiety disorder    GERD without esophagitis    Migraine with aura and without status migrainosus, not intractable    Supervision of other normal pregnancy, antepartum ([facility])    Overview        x 1  LTCS x 1  SAB x 1         Previous  section complicating pregnancy ([facility])    20 weeks gestation of pregnancy ([facility])    Overview     -  -dating by 6 wk US  -torn labrum, chronic follows with ortho  -RH NEG  -desires cfDNA  -LTCS for partial abruption    Desired provider in labor: [] CNM  [] Physician   [x] Either Acceptable  [x] Blood Products: [] Yes, accepts [] No, needs counseling  [x] Initial BMI: 22.49   [x] Prenatal Labs:   [x] Cervical Cancer Screening up to date:   NORMAL  [x] Rh status: A NEG  [x] Screen for IPV and Substance Use Risk: NEG  [x] Genetic Screening (cfDNA):  DESIRES  [x] First Trimester Anatomy Screen (11-13.6 wks): DECLINED  [x] Baby ASA (initiated):  STARTED  [x] Pregnancy dated by: 6 WK US    [x] Anatomy US: (19-20 wks) REVIEWED, F/U ACI  [x] Federal Sterilization consent signed (if indicated): N/A  [] 1hr GCT at 24-28wks:  [x] Rhogam (if indicated): INDICATED  [] Fetal Surveillance (if indicated):  [] Tdap (27-32 wks, may be given up to 36 wks if initial window missed):   []  RSV (32-36 wks) (Sept. to end ):     [] Feeding Intentions:  [] Postpartum Birth control method:   [] GBS at 36 - 37 wks:  [] 39 weeks discussion of IOL vs. Expectant management:  [x] Mode of delivery ( anticipated ): hoping for              Rd Pizarro MD

## 2025-04-07 PROBLEM — Z3A.24 24 WEEKS GESTATION OF PREGNANCY (HHS-HCC): Status: ACTIVE | Noted: 2025-03-11

## 2025-04-07 NOTE — PROGRESS NOTES
Ob Visit  25     Objective   Physical Exam:   Weight: 61.2 kg (135 lb)  Pregravid BMI: 22.49  BP: 112/60  Fetal Heart Rate: 140 Fundal Height (cm): 25 cm Presentation: Vertex         ASSESSMENT   Geno Julien is a 24 y.o.  at 24w4d with a working estimated date of delivery of 2025, by Ultrasound who presents for a routine prenatal visit.    The following concerns we addressed today:  -PLANNED TO SEE CHIROPRACTOR FOR HIP PAIN  -TDAP DISCUSSED  PLAN  -GTT/CBC/RH SCREEN NEXT VISIT  -RHOGAM NEXT VISIT  -RTC in 4 weeks    Problem List Items Addressed This Visit       Femoroacetabular impingement of left hip    Multiple thyroid nodules - Primary    Overview     FOLLOWED BY PCP  TSH NORMAL IN 2024  RECHECK THIRD TRIMESTER         Generalized anxiety disorder    GERD without esophagitis    Migraine with aura and without status migrainosus, not intractable    Supervision of other normal pregnancy, antepartum ([facility])    Overview        x 1  LTCS x 1  SAB x 1         Previous  section complicating pregnancy ([facility])    24 weeks gestation of pregnancy ([facility])    Overview     -  -dating by 6 wk US  -torn labrum, chronic follows with ortho  -RH NEG  -desires cfDNA  -LTCS for partial abruption    Desired provider in labor: [] CNM  [] Physician   [x] Either Acceptable  [x] Blood Products: [] Yes, accepts [] No, needs counseling  [x] Initial BMI: 22.49   [x] Prenatal Labs:   [x] Cervical Cancer Screening up to date:   NORMAL  [x] Rh status: A NEG  [x] Screen for IPV and Substance Use Risk: NEG  [x] Genetic Screening (cfDNA):  DESIRES  [x] First Trimester Anatomy Screen (11-13.6 wks): DECLINED  [x] Baby ASA (initiated):  STARTED  [x] Pregnancy dated by: 6 WK US    [x] Anatomy US: (19-20 wks) REVIEWED, F/U ACI  [x] Federal Sterilization consent signed (if indicated): N/A  [] 1hr GCT at 24-28wks:  [x] Rhogam (if indicated): INDICATED  [] Fetal Surveillance (if  indicated):  [] Tdap (27-32 wks, may be given up to 36 wks if initial window missed):   [] RSV (32-36 wks) (Sept. to end ):     [] Feeding Intentions:  [] Postpartum Birth control method:   [] GBS at 36 - 37 wks:  [] 39 weeks discussion of IOL vs. Expectant management:  [x] Mode of delivery ( anticipated ): hoping for          Relevant Orders    CBC    TSH with reflex to Free T4 if abnormal    Type And Screen Is this order related to pregnancy or an upcoming surgery? Yes; Where will this surgery/delivery be performed? [facility]; What is the date of the surgery? 2025; Has this patient ever had a transfusion? No; Has this...    Glucose, 1 Hour Screen, Pregnancy        Rd Pizarro MD

## 2025-04-08 ENCOUNTER — APPOINTMENT (OUTPATIENT)
Facility: CLINIC | Age: 25
End: 2025-04-08
Payer: COMMERCIAL

## 2025-04-08 VITALS — DIASTOLIC BLOOD PRESSURE: 60 MMHG | WEIGHT: 135 LBS | SYSTOLIC BLOOD PRESSURE: 112 MMHG | BODY MASS INDEX: 24.69 KG/M2

## 2025-04-08 DIAGNOSIS — F41.1 GENERALIZED ANXIETY DISORDER: ICD-10-CM

## 2025-04-08 DIAGNOSIS — Z34.80 SUPERVISION OF OTHER NORMAL PREGNANCY, ANTEPARTUM (HHS-HCC): ICD-10-CM

## 2025-04-08 DIAGNOSIS — K21.9 GERD WITHOUT ESOPHAGITIS: ICD-10-CM

## 2025-04-08 DIAGNOSIS — E04.2 MULTIPLE THYROID NODULES: Primary | ICD-10-CM

## 2025-04-08 DIAGNOSIS — Z3A.24 24 WEEKS GESTATION OF PREGNANCY (HHS-HCC): ICD-10-CM

## 2025-04-08 DIAGNOSIS — O34.219 PREVIOUS CESAREAN SECTION COMPLICATING PREGNANCY (HHS-HCC): ICD-10-CM

## 2025-04-08 DIAGNOSIS — G43.109 MIGRAINE WITH AURA AND WITHOUT STATUS MIGRAINOSUS, NOT INTRACTABLE: ICD-10-CM

## 2025-04-08 DIAGNOSIS — M25.852 FEMOROACETABULAR IMPINGEMENT OF LEFT HIP: ICD-10-CM

## 2025-04-08 PROCEDURE — 0501F PRENATAL FLOW SHEET: CPT | Performed by: OBSTETRICS & GYNECOLOGY

## 2025-05-06 ENCOUNTER — APPOINTMENT (OUTPATIENT)
Facility: CLINIC | Age: 25
End: 2025-05-06
Payer: COMMERCIAL

## 2025-05-06 VITALS — WEIGHT: 141 LBS | DIASTOLIC BLOOD PRESSURE: 84 MMHG | SYSTOLIC BLOOD PRESSURE: 126 MMHG | BODY MASS INDEX: 25.79 KG/M2

## 2025-05-06 DIAGNOSIS — E04.2 MULTIPLE THYROID NODULES: ICD-10-CM

## 2025-05-06 DIAGNOSIS — Z3A.28 28 WEEKS GESTATION OF PREGNANCY (HHS-HCC): ICD-10-CM

## 2025-05-06 DIAGNOSIS — O99.019 ANEMIA, ANTEPARTUM: ICD-10-CM

## 2025-05-06 DIAGNOSIS — Z29.13 NEED FOR RHOGAM DUE TO RH NEGATIVE MOTHER: ICD-10-CM

## 2025-05-06 DIAGNOSIS — O34.219 PREVIOUS CESAREAN SECTION COMPLICATING PREGNANCY (HHS-HCC): Primary | ICD-10-CM

## 2025-05-06 DIAGNOSIS — Z34.80 SUPERVISION OF OTHER NORMAL PREGNANCY, ANTEPARTUM (HHS-HCC): ICD-10-CM

## 2025-05-06 PROCEDURE — 99024 POSTOP FOLLOW-UP VISIT: CPT | Performed by: ADVANCED PRACTICE MIDWIFE

## 2025-05-06 ASSESSMENT — EDINBURGH POSTNATAL DEPRESSION SCALE (EPDS)
THINGS HAVE BEEN GETTING ON TOP OF ME: NO, I HAVE BEEN COPING AS WELL AS EVER
I HAVE BLAMED MYSELF UNNECESSARILY WHEN THINGS WENT WRONG: NO, NEVER
I HAVE BEEN SO UNHAPPY THAT I HAVE BEEN CRYING: NO, NEVER
TOTAL SCORE: 0
I HAVE BEEN SO UNHAPPY THAT I HAVE HAD DIFFICULTY SLEEPING: NOT AT ALL
I HAVE FELT SAD OR MISERABLE: NO, NOT AT ALL
I HAVE FELT SCARED OR PANICKY FOR NO GOOD REASON: NO, NOT AT ALL
THE THOUGHT OF HARMING MYSELF HAS OCCURRED TO ME: NEVER
I HAVE BEEN ANXIOUS OR WORRIED FOR NO GOOD REASON: NO, NOT AT ALL
I HAVE LOOKED FORWARD WITH ENJOYMENT TO THINGS: AS MUCH AS I EVER DID
I HAVE BEEN ABLE TO LAUGH AND SEE THE FUNNY SIDE OF THINGS: AS MUCH AS I ALWAYS COULD

## 2025-05-06 ASSESSMENT — ENCOUNTER SYMPTOMS
OCCASIONAL FEELINGS OF UNSTEADINESS: 0
LOSS OF SENSATION IN FEET: 0
DEPRESSION: 0

## 2025-05-06 NOTE — PROGRESS NOTES
Doing glucose tomorrow  Declines tdap  RTO 2 weeks with rhogam    Problem List Items Addressed This Visit          Ob-Gyn Problems    Supervision of other normal pregnancy, antepartum ([facility])    Overview      x 1  LTCS x 1  SAB x 1         Previous  section complicating pregnancy ([facility]) - Primary    Overview   LTCS    Indications for primary CS: partial abruption  Plan for delivery: TOLAC  []  MFMU score at ENOB  []  TOLAC consent signed          28 weeks gestation of pregnancy ([facility])    Overview   -  -dating by 6 wk US  -torn labrum, chronic follows with ortho  -RH NEG  -desires cfDNA  -LTCS for partial abruption    Desired provider in labor: [] CNM  [] Physician   [x] Either Acceptable  [x] Blood Products: [] Yes, accepts [] No, needs counseling  [x] Initial BMI: 22.49   [x] Prenatal Labs:   [x] Cervical Cancer Screening up to date:   NORMAL  [x] Rh status: A NEG  [x] Screen for IPV and Substance Use Risk: NEG  [x] Genetic Screening (cfDNA):  DESIRES  [x] First Trimester Anatomy Screen (11-13.6 wks): DECLINED  [x] Baby ASA (initiated):  STARTED  [x] Pregnancy dated by: 6 WK US    [x] Anatomy US: (19-20 wks) REVIEWED, F/U ACI  [x] Federal Sterilization consent signed (if indicated): N/A  [] 1hr GCT at 24-28wks:  [x] Rhogam (if indicated): INDICATED  [] Fetal Surveillance (if indicated):  [x] Tdap (27-32 wks, may be given up to 36 wks if initial window missed): declines  [] RSV (32-36 wks) (Sept. to end of ):     [] Feeding Intentions:  [] Postpartum Birth control method:   [] GBS at 36 - 37 wks:  [] 39 weeks discussion of IOL vs. Expectant management:  [x] Mode of delivery ( anticipated ): hoping for             Other    Multiple thyroid nodules    Overview   FOLLOWED BY PCP  TSH NORMAL IN 2024  RECHECK THIRD TRIMESTER          Other Visit Diagnoses         Need for rhogam due to Rh negative mother                   Subjective     Geno Julien is a 24 y.o.   at 28w5d with a working estimated date of delivery of 2025, by Ultrasound who presents for a routine prenatal visit.     Vaginal bleeding: denies  Leakage of fluid: denies   Fetal movements: endorses good fetal movement  Contractions: denies    Objective     Weight: 64 kg (141 lb)  TW.165 kg (18 lb)   Expected Total Weight Gain: 11.5 kg (25 lb)-16 kg (35 lb)   Pregravid BMI: 22.49  Pregravid Weight: 55.8 kg (123 lb)   BP: 126/84  Fetal Heart Rate: 120 Fundal Height (cm): 29 cm Presentation: Breech            Postpartum Depression: Low Risk  (2025)    [facility]  Depression Scale     Last EPDS Total Score: 0     Last EPDS Self Harm Result: Never        Education provided    Maru Brady, ANAHI-SANDRA

## 2025-05-07 ENCOUNTER — PATIENT MESSAGE (OUTPATIENT)
Dept: PRIMARY CARE | Facility: CLINIC | Age: 25
End: 2025-05-07
Payer: COMMERCIAL

## 2025-05-07 DIAGNOSIS — Z3A.24 24 WEEKS GESTATION OF PREGNANCY (HHS-HCC): ICD-10-CM

## 2025-05-07 PROBLEM — O99.019 ANEMIA, ANTEPARTUM: Status: ACTIVE | Noted: 2025-05-07

## 2025-05-07 LAB
ERYTHROCYTE [DISTWIDTH] IN BLOOD BY AUTOMATED COUNT: 11.7 % (ref 11–15)
GLUCOSE 1H P 50 G GLC PO SERPL-MCNC: 79 MG/DL
HCT VFR BLD AUTO: 32.1 % (ref 35–45)
HGB BLD-MCNC: 10.4 G/DL (ref 11.7–15.5)
MCH RBC QN AUTO: 28.2 PG (ref 27–33)
MCHC RBC AUTO-ENTMCNC: 32.4 G/DL (ref 32–36)
MCV RBC AUTO: 87 FL (ref 80–100)
PLATELET # BLD AUTO: 411 THOUSAND/UL (ref 140–400)
PMV BLD REES-ECKER: 10.5 FL (ref 7.5–12.5)
RBC # BLD AUTO: 3.69 MILLION/UL (ref 3.8–5.1)
WBC # BLD AUTO: 11.2 THOUSAND/UL (ref 3.8–10.8)

## 2025-05-07 RX ORDER — FERROUS SULFATE 325(65) MG
1 TABLET ORAL 2 TIMES DAILY
Qty: 60 TABLET | Refills: 3 | Status: SHIPPED | OUTPATIENT
Start: 2025-05-07

## 2025-05-08 DIAGNOSIS — J01.00 ACUTE NON-RECURRENT MAXILLARY SINUSITIS: Primary | ICD-10-CM

## 2025-05-08 RX ORDER — AMOXICILLIN AND CLAVULANATE POTASSIUM 400; 57 MG/5ML; MG/5ML
875 POWDER, FOR SUSPENSION ORAL 2 TIMES DAILY
Qty: 218 ML | Refills: 0 | Status: SHIPPED | OUTPATIENT
Start: 2025-05-08 | End: 2025-05-18

## 2025-05-08 NOTE — PROGRESS NOTES
Patient's children were seen in office. Patient is having 5d hx of sinus congestion, facial ttp, cough, and feeling of something in her chest. No sob, wheezing, n/v/f/chills. No diarrhea. Currently 29wk pregnant.   Called 's office but 2/2 her concerns on the phone was directed to ER- she refuses to go to ER.     +swelling nasal turbinates and some pnd w/o erythema/exudates.   No increased WOB, no wheezing      D/w  and sent in augmentin (liquid for pt pregnancy)

## 2025-05-09 ENCOUNTER — LAB (OUTPATIENT)
Dept: LAB | Facility: HOSPITAL | Age: 25
End: 2025-05-09
Payer: COMMERCIAL

## 2025-05-09 DIAGNOSIS — Z3A.28 28 WEEKS GESTATION OF PREGNANCY (HHS-HCC): ICD-10-CM

## 2025-05-16 ENCOUNTER — LAB (OUTPATIENT)
Dept: LAB | Facility: HOSPITAL | Age: 25
End: 2025-05-16
Payer: COMMERCIAL

## 2025-05-16 LAB
ABO GROUP (TYPE) IN BLOOD: NORMAL
ANTIBODY SCREEN: NORMAL
RH FACTOR (ANTIGEN D): NORMAL

## 2025-05-16 PROCEDURE — 86900 BLOOD TYPING SEROLOGIC ABO: CPT

## 2025-05-16 PROCEDURE — 86901 BLOOD TYPING SEROLOGIC RH(D): CPT

## 2025-05-16 PROCEDURE — 86850 RBC ANTIBODY SCREEN: CPT

## 2025-05-17 LAB — TSH SERPL-ACNC: 1.75 MIU/L

## 2025-05-19 LAB
BB ANTIBODY IDENTIFICATION: NORMAL
CASE #: NORMAL

## 2025-05-21 ENCOUNTER — APPOINTMENT (OUTPATIENT)
Facility: CLINIC | Age: 25
End: 2025-05-21
Payer: COMMERCIAL

## 2025-05-21 VITALS — SYSTOLIC BLOOD PRESSURE: 114 MMHG | WEIGHT: 141 LBS | BODY MASS INDEX: 25.79 KG/M2 | DIASTOLIC BLOOD PRESSURE: 80 MMHG

## 2025-05-21 DIAGNOSIS — F41.1 GENERALIZED ANXIETY DISORDER: ICD-10-CM

## 2025-05-21 DIAGNOSIS — Z3A.30 30 WEEKS GESTATION OF PREGNANCY (HHS-HCC): ICD-10-CM

## 2025-05-21 DIAGNOSIS — O99.019 ANEMIA, ANTEPARTUM: ICD-10-CM

## 2025-05-21 DIAGNOSIS — Z34.80 SUPERVISION OF OTHER NORMAL PREGNANCY, ANTEPARTUM (HHS-HCC): ICD-10-CM

## 2025-05-21 DIAGNOSIS — O34.219 PREVIOUS CESAREAN SECTION COMPLICATING PREGNANCY (HHS-HCC): ICD-10-CM

## 2025-05-21 DIAGNOSIS — Z34.91 FIRST TRIMESTER PREGNANCY (HHS-HCC): ICD-10-CM

## 2025-05-21 DIAGNOSIS — E04.2 MULTIPLE THYROID NODULES: Primary | ICD-10-CM

## 2025-05-21 PROCEDURE — 0501F PRENATAL FLOW SHEET: CPT | Performed by: OBSTETRICS & GYNECOLOGY

## 2025-05-21 ASSESSMENT — ENCOUNTER SYMPTOMS
CONSTITUTIONAL NEGATIVE: 0
EYES NEGATIVE: 0
ALLERGIC/IMMUNOLOGIC NEGATIVE: 0
HEMATOLOGIC/LYMPHATIC NEGATIVE: 0
GASTROINTESTINAL NEGATIVE: 0
PSYCHIATRIC NEGATIVE: 0
MUSCULOSKELETAL NEGATIVE: 0
NEUROLOGICAL NEGATIVE: 0
CARDIOVASCULAR NEGATIVE: 0
RESPIRATORY NEGATIVE: 0
ENDOCRINE NEGATIVE: 0

## 2025-05-21 NOTE — PROGRESS NOTES
Ob Visit  25     Objective   Physical Exam:   Weight: 64 kg (141 lb)  Pregravid BMI: 22.49  BP: 114/80  Fetal Heart Rate: 140 Fundal Height (cm): 30 cm Presentation: Vertex           ASSESSMENT   Geno Julien is a 24 y.o.  at 30w6d with a working estimated date of delivery of 2025, by Ultrasound who presents for a routine prenatal visit.    The following concerns we addressed today:  BB order reviewed Fernando present, all clinically significant antibodies ruled out  GTT reviewed (79)  Counseled on tdap    PLAN  Will check with BB on result, return for rhogam is neg. Screen.  Declined tdap  -RTC in 3 weeks    Problem List Items Addressed This Visit       Multiple thyroid nodules - Primary    Overview   FOLLOWED BY PCP  TSH NORMAL IN 2024  RECHECK THIRD TRIMESTER:  1.75 (normal)         Generalized anxiety disorder    Supervision of other normal pregnancy, antepartum ([facility])    Overview      x 2  LTCS x 1  SAB x 1         Previous  section complicating pregnancy ([facility])    Overview   LTCS    Indications for primary CS: partial abruption  Plan for delivery: TOLAC  [x]  MFMU score at ENOB:  83.7%  []  TOLAC consent signed          30 weeks gestation of pregnancy ([facility])    Overview   -  -dating by 6 wk US  -torn labrum, chronic follows with ortho  -RH NEG  -desires cfDNA  -LTCS for partial abruption    Desired provider in labor: [] CNM  [] Physician   [x] Either Acceptable  [x] Blood Products: [] Yes, accepts [] No, needs counseling  [x] Initial BMI: 22.49   [x] Prenatal Labs:   [x] Cervical Cancer Screening up to date:   NORMAL  [x] Rh status: A NEG  [x] Screen for IPV and Substance Use Risk: NEG  [x] Genetic Screening (cfDNA):  DESIRES  [x] First Trimester Anatomy Screen (11-13.6 wks): DECLINED  [x] Baby ASA (initiated):  STARTED  [x] Pregnancy dated by: 6 WK US    [x] Anatomy US: (19-20 wks) REVIEWED, F/U ACI  [x] Federal Sterilization consent  signed (if indicated): N/A  [x] 1hr GCT at 24-28wks: 79  [x] Rhogam (if indicated): Rouleaux present, all clinically significant antibodies ruled out.  Received rhogam 2025  [] Fetal Surveillance (if indicated):  [x] Tdap (27-32 wks, may be given up to 36 wks if initial window missed): declines  [] RSV (32-36 wks) (Sept. to end ):     [] Feeding Intentions:  [] Postpartum Birth control method:   [] GBS at 36 - 37 wks:  [] 39 weeks discussion of IOL vs. Expectant management:  [x] Mode of delivery ( anticipated ): hoping for          Anemia, antepartum    Overview   : h/h 10.4/32.1, started po iron, repeat CBC retic @ next visit         Relevant Orders    Reticulocytes     Other Visit Diagnoses         First trimester pregnancy ([facility])        Relevant Orders    CBC Anemia Panel With Reflex,Pregnancy             Rd Pizarro MD

## 2025-05-22 ENCOUNTER — CLINICAL SUPPORT (OUTPATIENT)
Facility: CLINIC | Age: 25
End: 2025-05-22
Payer: COMMERCIAL

## 2025-05-22 DIAGNOSIS — Z67.91 RH NEGATIVE STATE IN ANTEPARTUM PERIOD (HHS-HCC): Primary | ICD-10-CM

## 2025-05-22 DIAGNOSIS — O26.899 RH NEGATIVE STATE IN ANTEPARTUM PERIOD (HHS-HCC): Primary | ICD-10-CM

## 2025-05-23 PROBLEM — Z3A.30 30 WEEKS GESTATION OF PREGNANCY (HHS-HCC): Status: ACTIVE | Noted: 2025-03-11

## 2025-06-02 PROBLEM — Z3A.32 32 WEEKS GESTATION OF PREGNANCY (HHS-HCC): Status: ACTIVE | Noted: 2025-03-11

## 2025-06-02 NOTE — PROGRESS NOTES
Ob Visit  25     Objective   Physical Exam:   Weight: 64.9 kg (143 lb)  Pregravid BMI: 22.49  BP: 122/74  Fetal Heart Rate: 146 Fundal Height (cm): 34 cm Presentation: Vertex           ASSESSMENT   Geno Julien is a 24 y.o.  at 32w4d with a working estimated date of delivery of 2025, by Ultrasound who presents for a routine prenatal visit.    The following concerns we addressed today:  -denies signs of PTL.  Fetus active  -up to date on 3rd trimester labs, rhogam, declined tdap  PLAN  -TOLAC counseling.  Pt. With urgent  last pregnancy 2/2 partial abruption.  Having anxiety regarding delivery.  Counseled  -growth scan at f/up for s>d  -RTC in 2 weeks    Problem List Items Addressed This Visit       Femoroacetabular impingement of left hip    Multiple thyroid nodules - Primary    Overview   FOLLOWED BY PCP  TSH NORMAL IN 2024  RECHECK THIRD TRIMESTER:  1.75 (normal)         Generalized anxiety disorder    GERD without esophagitis    Supervision of other normal pregnancy, antepartum ([facility])    Overview      x 2  LTCS x 1  SAB x 1         Previous  section complicating pregnancy ([facility])    Overview   LTCS    Indications for primary CS: partial abruption  Plan for delivery: TOLAC  [x]  MFMU score at ENOB:  83.7%  []  TOLAC consent signed          32 weeks gestation of pregnancy ([facility])    Overview   -  -dating by 6 wk US  -torn labrum, chronic follows with ortho  -RH NEG  -desires cfDNA  -LTCS for partial abruption    Desired provider in labor: [] CNM  [] Physician   [x] Either Acceptable  [x] Blood Products: [] Yes, accepts [] No, needs counseling  [x] Initial BMI: 22.49   [x] Prenatal Labs:   [x] Cervical Cancer Screening up to date:   NORMAL  [x] Rh status: A NEG  [x] Screen for IPV and Substance Use Risk: NEG  [x] Genetic Screening (cfDNA):  DESIRES  [x] First Trimester Anatomy Screen (11-13.6 wks): DECLINED  [x] Baby ASA (initiated):   STARTED  [x] Pregnancy dated by: 6 WK US    [x] Anatomy US: (19-20 wks) REVIEWED, F/U ACI  [x] Federal Sterilization consent signed (if indicated): N/A  [x] 1hr GCT at 24-28wks: 79  [x] Rhogam (if indicated): Rouleaux present, all clinically significant antibodies ruled out.  Received rhogam 2025  [] Fetal Surveillance (if indicated):  [x] Tdap (27-32 wks, may be given up to 36 wks if initial window missed): declines  [] RSV (32-36 wks) (Sept. to end of ):     [] Feeding Intentions:  [] Postpartum Birth control method:   [] GBS at 36 - 37 wks:  [] 39 weeks discussion of IOL vs. Expectant management:  [x] Mode of delivery ( anticipated ): hoping for          Anemia, antepartum    Overview   : h/h 10.4/32.1, started po iron, repeat CBC retic @ next visit             Rd Pizarro MD

## 2025-06-04 ENCOUNTER — APPOINTMENT (OUTPATIENT)
Facility: CLINIC | Age: 25
End: 2025-06-04
Payer: COMMERCIAL

## 2025-06-04 VITALS — BODY MASS INDEX: 26.16 KG/M2 | WEIGHT: 143 LBS | SYSTOLIC BLOOD PRESSURE: 122 MMHG | DIASTOLIC BLOOD PRESSURE: 74 MMHG

## 2025-06-04 DIAGNOSIS — Z3A.32 32 WEEKS GESTATION OF PREGNANCY (HHS-HCC): ICD-10-CM

## 2025-06-04 DIAGNOSIS — O99.019 ANEMIA, ANTEPARTUM: ICD-10-CM

## 2025-06-04 DIAGNOSIS — O34.219 PREVIOUS CESAREAN SECTION COMPLICATING PREGNANCY (HHS-HCC): ICD-10-CM

## 2025-06-04 DIAGNOSIS — E04.2 MULTIPLE THYROID NODULES: Primary | ICD-10-CM

## 2025-06-04 DIAGNOSIS — Z34.80 SUPERVISION OF OTHER NORMAL PREGNANCY, ANTEPARTUM (HHS-HCC): ICD-10-CM

## 2025-06-04 DIAGNOSIS — K21.9 GERD WITHOUT ESOPHAGITIS: ICD-10-CM

## 2025-06-04 DIAGNOSIS — F41.1 GENERALIZED ANXIETY DISORDER: ICD-10-CM

## 2025-06-04 DIAGNOSIS — M25.852 FEMOROACETABULAR IMPINGEMENT OF LEFT HIP: ICD-10-CM

## 2025-06-04 PROCEDURE — 0501F PRENATAL FLOW SHEET: CPT | Performed by: OBSTETRICS & GYNECOLOGY

## 2025-06-06 DIAGNOSIS — O99.019 ANEMIA, ANTEPARTUM: ICD-10-CM

## 2025-06-16 PROBLEM — Z3A.34 34 WEEKS GESTATION OF PREGNANCY (HHS-HCC): Status: ACTIVE | Noted: 2025-03-11

## 2025-06-16 NOTE — PROGRESS NOTES
Ob Visit  25     Objective   Physical Exam:   Weight: 66.9 kg (147 lb 6.4 oz)  Pregravid BMI: 22.49  BP: 118/60  Fetal Heart Rate: 142 Fundal Height (cm): 36 cm Presentation: Vertex           ASSESSMENT   Geno Julien is a 24 y.o.  at 34w4d with a working estimated date of delivery of 2025, by Ultrasound who presents for a routine prenatal visit.    The following concerns we addressed today:  -TOLAC counseling  -feeding intentions Breast  -PP BC options  -s>d    PLAN  -growth scan, will schedule to help with decision for mode of delivery  -GBS and consent next visit.  Tolac consent if desires.  -RTC in 2 weeks    Problem List Items Addressed This Visit       Attention deficit    Femoroacetabular impingement of left hip    Multiple thyroid nodules - Primary    Overview   FOLLOWED BY PCP  TSH NORMAL IN 2024  RECHECK THIRD TRIMESTER:  1.75 (normal)         Generalized anxiety disorder    Migraine with aura and without status migrainosus, not intractable    Supervision of other normal pregnancy, antepartum ([facility])    Overview      x 2  LTCS x 1  SAB x 1         Previous  section complicating pregnancy ([facility])    Overview   LTCS    Indications for primary CS: partial abruption  Plan for delivery: TOLAC  [x]  MFMU score at ENOB:  83.7%  []  TOLAC consent signed          34 weeks gestation of pregnancy ([facility])    Overview   -  -dating by 6 wk US  -torn labrum, chronic follows with ortho  -RH NEG  -desires cfDNA  -LTCS for partial abruption    Desired provider in labor: [] CNM  [] Physician   [x] Either Acceptable  [x] Blood Products: [] Yes, accepts [] No, needs counseling  [x] Initial BMI: 22.49   [x] Prenatal Labs:   [x] Cervical Cancer Screening up to date:   NORMAL  [x] Rh status: A NEG  [x] Screen for IPV and Substance Use Risk: NEG  [x] Genetic Screening (cfDNA):  DESIRES  [x] First Trimester Anatomy Screen (11-13.6 wks): DECLINED  [x] Baby ASA  (initiated):  STARTED  [x] Pregnancy dated by: 6 WK US    [x] Anatomy US: (19-20 wks) REVIEWED, F/U ACI  [x] Federal Sterilization consent signed (if indicated): N/A  [x] 1hr GCT at 24-28wks: 79  [x] Rhogam (if indicated): Rouleaux present, all clinically significant antibodies ruled out.  Received rhogam 2025  [] Fetal Surveillance (if indicated):  [x] Tdap (27-32 wks, may be given up to 36 wks if initial window missed): declines    [x] Feeding Intentions: Breast  [] Postpartum Birth control method:   [] GBS at 36 - 37 wks:  [] 39 weeks discussion of IOL vs. Expectant management:  [x] Mode of delivery ( anticipated ): hoping for          Anemia, antepartum    Overview   : h/h 10.4/32.1, started po iron, repeat CBC retic @ next visit          Other Visit Diagnoses         Excessive fetal growth affecting management of pregnancy in third trimester, fetus 1 of multiple gestation ([facility])                 Rd Pizarro MD

## 2025-06-18 ENCOUNTER — APPOINTMENT (OUTPATIENT)
Facility: CLINIC | Age: 25
End: 2025-06-18
Payer: COMMERCIAL

## 2025-06-18 VITALS — WEIGHT: 147.4 LBS | DIASTOLIC BLOOD PRESSURE: 60 MMHG | SYSTOLIC BLOOD PRESSURE: 118 MMHG | BODY MASS INDEX: 26.96 KG/M2

## 2025-06-18 DIAGNOSIS — R41.840 ATTENTION DEFICIT: ICD-10-CM

## 2025-06-18 DIAGNOSIS — G43.109 MIGRAINE WITH AURA AND WITHOUT STATUS MIGRAINOSUS, NOT INTRACTABLE: ICD-10-CM

## 2025-06-18 DIAGNOSIS — E04.2 MULTIPLE THYROID NODULES: Primary | ICD-10-CM

## 2025-06-18 DIAGNOSIS — M25.852 FEMOROACETABULAR IMPINGEMENT OF LEFT HIP: ICD-10-CM

## 2025-06-18 DIAGNOSIS — F41.1 GENERALIZED ANXIETY DISORDER: ICD-10-CM

## 2025-06-18 DIAGNOSIS — Z3A.34 34 WEEKS GESTATION OF PREGNANCY (HHS-HCC): ICD-10-CM

## 2025-06-18 DIAGNOSIS — O34.219 PREVIOUS CESAREAN SECTION COMPLICATING PREGNANCY (HHS-HCC): ICD-10-CM

## 2025-06-18 DIAGNOSIS — O36.63X1 EXCESSIVE FETAL GROWTH AFFECTING MANAGEMENT OF PREGNANCY IN THIRD TRIMESTER, FETUS 1 OF MULTIPLE GESTATION (HHS-HCC): ICD-10-CM

## 2025-06-18 DIAGNOSIS — Z34.80 SUPERVISION OF OTHER NORMAL PREGNANCY, ANTEPARTUM (HHS-HCC): ICD-10-CM

## 2025-06-18 DIAGNOSIS — O99.019 ANEMIA, ANTEPARTUM: ICD-10-CM

## 2025-06-18 PROCEDURE — 0501F PRENATAL FLOW SHEET: CPT | Performed by: OBSTETRICS & GYNECOLOGY

## 2025-06-18 ASSESSMENT — ENCOUNTER SYMPTOMS
CONSTITUTIONAL NEGATIVE: 0
RESPIRATORY NEGATIVE: 0
EYES NEGATIVE: 0
CARDIOVASCULAR NEGATIVE: 0
HEMATOLOGIC/LYMPHATIC NEGATIVE: 0
ENDOCRINE NEGATIVE: 0
ALLERGIC/IMMUNOLOGIC NEGATIVE: 0
MUSCULOSKELETAL NEGATIVE: 0
NEUROLOGICAL NEGATIVE: 0
GASTROINTESTINAL NEGATIVE: 0
PSYCHIATRIC NEGATIVE: 0

## 2025-06-25 ENCOUNTER — HOSPITAL ENCOUNTER (OUTPATIENT)
Dept: RADIOLOGY | Facility: CLINIC | Age: 25
Discharge: HOME | End: 2025-06-25
Payer: COMMERCIAL

## 2025-06-25 DIAGNOSIS — Z36.89 ENCOUNTER FOR FETAL ANATOMIC SURVEY (HHS-HCC): ICD-10-CM

## 2025-06-25 PROCEDURE — 76816 OB US FOLLOW-UP PER FETUS: CPT

## 2025-06-30 ENCOUNTER — PREP FOR PROCEDURE (OUTPATIENT)
Dept: OBSTETRICS AND GYNECOLOGY | Facility: HOSPITAL | Age: 25
End: 2025-06-30
Payer: COMMERCIAL

## 2025-06-30 PROBLEM — Z3A.36 36 WEEKS GESTATION OF PREGNANCY (HHS-HCC): Status: ACTIVE | Noted: 2025-03-11

## 2025-07-01 NOTE — PROGRESS NOTES
Ob Visit  25     Objective   Physical Exam:   Weight: 67.6 kg (149 lb)  Pregravid BMI: 22.49  BP: 122/74  Fetal Heart Rate: 144 Fundal Height (cm): 37 cm Presentation: Vertex  Dilation: 2 Effacement (%): 60 Fetal Station: -2    ASSESSMENT   Geno Julien is a 24 y.o.  at 36w4d with a working estimated date of delivery of 2025, by Ultrasound who presents for a routine prenatal visit.    The following concerns we addressed today:  -growth scan reviewed, AGA  -delivery planning.  Would like TOLAC.  Scheduled pitocin IOL    PLAN  -GBS, consent today  -TOLAC consent signed  -RTC in 1 weeks    Problem List Items Addressed This Visit       Femoroacetabular impingement of left hip    Multiple thyroid nodules - Primary    Overview   FOLLOWED BY PCP  TSH NORMAL IN 2024  RECHECK THIRD TRIMESTER:  1.75 (normal)         Generalized anxiety disorder    GERD without esophagitis    Supervision of other normal pregnancy, antepartum ([facility])    Overview      x 2  LTCS x 1  SAB x 1         Previous  section complicating pregnancy ([facility])    Overview   LTCS    Indications for primary CS: partial abruption  Plan for delivery: TOLAC  [x]  MFMU score at ENOB:  83.7%  [x]  TOLAC consent signed          36 weeks gestation of pregnancy ([facility])    Overview   -  -dating by 6 wk US  -torn labrum, chronic follows with ortho  -RH NEG  -desires cfDNA  -LTCS for partial abruption    Desired provider in labor: [] CNM  [] Physician   [x] Either Acceptable  [x] Blood Products: [] Yes, accepts [] No, needs counseling  [x] Initial BMI: 22.49   [x] Prenatal Labs:   [x] Cervical Cancer Screening up to date:   NORMAL  [x] Rh status: A NEG  [x] Screen for IPV and Substance Use Risk: NEG  [x] Genetic Screening (cfDNA):  DESIRES  [x] First Trimester Anatomy Screen (11-13.6 wks): DECLINED  [x] Baby ASA (initiated):  STARTED  [x] Pregnancy dated by: 6 WK US    [x] Anatomy US: (19-20 wks) REVIEWED,  F/U ACI  [x] Federal Sterilization consent signed (if indicated): N/A  [x] 1hr GCT at 24-28wks: 79  [x] Rhogam (if indicated): Rouleaux present, all clinically significant antibodies ruled out.  Received rhogam 2025  [x] Fetal Surveillance (if indicated): Growth scan,  counseling: Appropriate fetal growth: 2988 g at the 71%   [x] Tdap (27-32 wks, may be given up to 36 wks if initial window missed): declines    [x] Feeding Intentions: Breast  [x] Postpartum Birth control method: considering vasectomy  [x] GBS at 36 - 37 wks:  [x] 39 weeks discussion of IOL vs. Expectant management: desires IOL  [x] Mode of delivery ( anticipated ): hoping for          Relevant Orders    Group B Streptococcus (GBS) Prenatal Screen, Culture    Anemia, antepartum    Overview   : h/h 10.4/32.1, started po iron, repeat CBC retic @ next visit             Rd Pizarro MD

## 2025-07-02 ENCOUNTER — APPOINTMENT (OUTPATIENT)
Facility: CLINIC | Age: 25
End: 2025-07-02
Payer: COMMERCIAL

## 2025-07-02 VITALS — DIASTOLIC BLOOD PRESSURE: 74 MMHG | WEIGHT: 149 LBS | SYSTOLIC BLOOD PRESSURE: 122 MMHG | BODY MASS INDEX: 27.25 KG/M2

## 2025-07-02 DIAGNOSIS — F41.1 GENERALIZED ANXIETY DISORDER: ICD-10-CM

## 2025-07-02 DIAGNOSIS — M25.852 FEMOROACETABULAR IMPINGEMENT OF LEFT HIP: ICD-10-CM

## 2025-07-02 DIAGNOSIS — K21.9 GERD WITHOUT ESOPHAGITIS: ICD-10-CM

## 2025-07-02 DIAGNOSIS — Z3A.36 36 WEEKS GESTATION OF PREGNANCY (HHS-HCC): ICD-10-CM

## 2025-07-02 DIAGNOSIS — E04.2 MULTIPLE THYROID NODULES: Primary | ICD-10-CM

## 2025-07-02 DIAGNOSIS — Z34.80 SUPERVISION OF OTHER NORMAL PREGNANCY, ANTEPARTUM (HHS-HCC): ICD-10-CM

## 2025-07-02 DIAGNOSIS — O99.019 ANEMIA, ANTEPARTUM: ICD-10-CM

## 2025-07-02 DIAGNOSIS — O34.219 PREVIOUS CESAREAN SECTION COMPLICATING PREGNANCY (HHS-HCC): ICD-10-CM

## 2025-07-02 PROCEDURE — 0501F PRENATAL FLOW SHEET: CPT | Performed by: OBSTETRICS & GYNECOLOGY

## 2025-07-02 ASSESSMENT — ENCOUNTER SYMPTOMS
DEPRESSION: 0
LOSS OF SENSATION IN FEET: 0
OCCASIONAL FEELINGS OF UNSTEADINESS: 0

## 2025-07-03 ENCOUNTER — PREP FOR PROCEDURE (OUTPATIENT)
Facility: CLINIC | Age: 25
End: 2025-07-03
Payer: COMMERCIAL

## 2025-07-05 LAB — GP B STREP SPEC QL CULT: NORMAL

## 2025-07-06 PROBLEM — Z3A.37 37 WEEKS GESTATION OF PREGNANCY (HHS-HCC): Status: ACTIVE | Noted: 2025-03-11

## 2025-07-07 NOTE — PROGRESS NOTES
Ob Visit  25     Objective   Physical Exam:   Weight: 68.5 kg (151 lb)  Pregravid BMI: 22.49  BP: 108/64  Fetal Heart Rate: 140 Fundal Height (cm): 39 cm Presentation: Vertex  Dilation: 2 Effacement (%): 70 Fetal Station: -2    ASSESSMENT   Geno Julien is a 25 y.o.  at 37w3d with a working estimated date of delivery of 2025, by Ultrasound who presents for a routine prenatal visit.    The following concerns we addressed today:   -gbS neg.  -IOL schduled  PLAN  -GBS  NEG  -RTC in 1 weeks    Problem List Items Addressed This Visit       Femoroacetabular impingement of left hip    Generalized anxiety disorder    Migraine with aura and without status migrainosus, not intractable    Supervision of other normal pregnancy, antepartum ([facility])    Overview      x 2  LTCS x 1  SAB x 1         Previous  section complicating pregnancy ([facility])    Overview   LTCS    Indications for primary CS: partial abruption  Plan for delivery: TOLAC  [x]  MFMU score at ENOB:  83.7%  [x]  TOLAC consent signed          37 weeks gestation of pregnancy ([facility]) - Primary    Overview   -  -dating by 6 wk US  -torn labrum, chronic follows with ortho  -RH NEG  -desires cfDNA  -LTCS for partial abruption    Desired provider in labor: [] CNM  [] Physician   [x] Either Acceptable  [x] Blood Products: [] Yes, accepts [] No, needs counseling  [x] Initial BMI: 22.49   [x] Prenatal Labs:   [x] Cervical Cancer Screening up to date:   NORMAL  [x] Rh status: A NEG  [x] Screen for IPV and Substance Use Risk: NEG  [x] Genetic Screening (cfDNA):  DESIRES  [x] First Trimester Anatomy Screen (11-13.6 wks): DECLINED  [x] Baby ASA (initiated):  STARTED  [x] Pregnancy dated by: 6 WK US    [x] Anatomy US: (19-20 wks) REVIEWED, F/U ACI  [x] Federal Sterilization consent signed (if indicated): N/A  [x] 1hr GCT at 24-28wks: 79  [x] Rhogam (if indicated): Rouleaux present, all clinically significant antibodies  ruled out.  Received rhogam 2025  [x] Fetal Surveillance (if indicated): Growth scan,  counseling: Appropriate fetal growth: 2988 g at the 71%   [x] Tdap (27-32 wks, may be given up to 36 wks if initial window missed): declines    [x] Feeding Intentions: Breast  [x] Postpartum Birth control method: considering vasectomy  [x] GBS at 36 - 37 wks:  [x] 39 weeks discussion of IOL vs. Expectant management: desires IOL  [x] Mode of delivery ( anticipated ): hoping for          Anemia, antepartum    Overview   : h/h 10.4/32.1, started po iron, repeat CBC retic @ next visit             Rd Pizarro MD

## 2025-07-09 ENCOUNTER — APPOINTMENT (OUTPATIENT)
Facility: CLINIC | Age: 25
End: 2025-07-09
Payer: COMMERCIAL

## 2025-07-09 VITALS — DIASTOLIC BLOOD PRESSURE: 64 MMHG | BODY MASS INDEX: 27.62 KG/M2 | WEIGHT: 151 LBS | SYSTOLIC BLOOD PRESSURE: 108 MMHG

## 2025-07-09 DIAGNOSIS — G43.109 MIGRAINE WITH AURA AND WITHOUT STATUS MIGRAINOSUS, NOT INTRACTABLE: ICD-10-CM

## 2025-07-09 DIAGNOSIS — O34.219 PREVIOUS CESAREAN SECTION COMPLICATING PREGNANCY (HHS-HCC): ICD-10-CM

## 2025-07-09 DIAGNOSIS — K21.9 GERD WITHOUT ESOPHAGITIS: ICD-10-CM

## 2025-07-09 DIAGNOSIS — Z34.80 SUPERVISION OF OTHER NORMAL PREGNANCY, ANTEPARTUM (HHS-HCC): ICD-10-CM

## 2025-07-09 DIAGNOSIS — O99.019 ANEMIA, ANTEPARTUM: ICD-10-CM

## 2025-07-09 DIAGNOSIS — F41.1 GENERALIZED ANXIETY DISORDER: ICD-10-CM

## 2025-07-09 DIAGNOSIS — M25.852 FEMOROACETABULAR IMPINGEMENT OF LEFT HIP: ICD-10-CM

## 2025-07-09 DIAGNOSIS — Z3A.37 37 WEEKS GESTATION OF PREGNANCY (HHS-HCC): Primary | ICD-10-CM

## 2025-07-09 PROCEDURE — 0501F PRENATAL FLOW SHEET: CPT | Performed by: OBSTETRICS & GYNECOLOGY

## 2025-07-09 RX ORDER — OMEPRAZOLE 40 MG/1
CAPSULE, DELAYED RELEASE ORAL
Qty: 60 CAPSULE | Refills: 2 | Status: SHIPPED | OUTPATIENT
Start: 2025-07-09

## 2025-07-13 PROBLEM — Z3A.38 38 WEEKS GESTATION OF PREGNANCY (HHS-HCC): Status: ACTIVE | Noted: 2025-03-11

## 2025-07-13 NOTE — PROGRESS NOTES
Ob Visit  25     Objective   Physical Exam:   Weight: 69.9 kg (154 lb)  Pregravid BMI: 22.49  BP: 112/74  Fetal Heart Rate: 140 Fundal Height (cm): 38 cm Presentation: Vertex  Dilation: 2 Effacement (%): 60 Fetal Station: -2    ASSESSMENT   Geno Julien is a 25 y.o.  at 38w3d with a working estimated date of delivery of 2025, by Ultrasound who presents for a routine prenatal visit.    The following concerns we addressed today:  Denies VB, LOF, CTX.  Endorses FM    PLAN  -IOL scheduled for .  Pt. Continues to prefer TOLAC.  EFW 3800g.  -GBS neg  -RTC in 2 weeks    Problem List Items Addressed This Visit       Femoroacetabular impingement of left hip    Generalized anxiety disorder    Supervision of other normal pregnancy, antepartum ([facility])    Overview      x 2  LTCS x 1  SAB x 1         Previous  section complicating pregnancy ([facility])    Overview   LTCS    Indications for primary CS: partial abruption  Plan for delivery: TOLAC  [x]  MFMU score at ENOB:  83.7%  [x]  TOLAC consent signed          38 weeks gestation of pregnancy ([facility]) - Primary    Overview   -  -dating by 6 wk US  -torn labrum, chronic follows with ortho  -RH NEG  -desires cfDNA  -LTCS for partial abruption    Desired provider in labor: [] CNM  [] Physician   [x] Either Acceptable  [x] Blood Products: [] Yes, accepts [] No, needs counseling  [x] Initial BMI: 22.49   [x] Prenatal Labs:   [x] Cervical Cancer Screening up to date:   NORMAL  [x] Rh status: A NEG  [x] Screen for IPV and Substance Use Risk: NEG  [x] Genetic Screening (cfDNA):  DESIRES  [x] First Trimester Anatomy Screen (11-13.6 wks): DECLINED  [x] Baby ASA (initiated):  STARTED  [x] Pregnancy dated by: 6 WK US    [x] Anatomy US: (19-20 wks) REVIEWED, F/U ACI  [x] Federal Sterilization consent signed (if indicated): N/A  [x] 1hr GCT at 24-28wks: 79  [x] Rhogam (if indicated): Rouleaux present, all clinically significant  antibodies ruled out.  Received rhogam 2025  [x] Fetal Surveillance (if indicated): Growth scan,  counseling: Appropriate fetal growth: 2988 g at the 71%   [x] Tdap (27-32 wks, may be given up to 36 wks if initial window missed): declines    [x] Feeding Intentions: Breast  [x] Postpartum Birth control method: considering vasectomy  [x] GBS at 36 - 37 wks:  [x] 39 weeks discussion of IOL vs. Expectant management: desires IOL  [x] Mode of delivery ( anticipated ): hoping for          Anemia, antepartum    Overview   : h/h 10.4/32.1, started po iron, repeat CBC retic @ next visit             Rd Pizarro MD

## 2025-07-16 ENCOUNTER — APPOINTMENT (OUTPATIENT)
Facility: CLINIC | Age: 25
End: 2025-07-16
Payer: COMMERCIAL

## 2025-07-16 VITALS — SYSTOLIC BLOOD PRESSURE: 112 MMHG | BODY MASS INDEX: 28.17 KG/M2 | WEIGHT: 154 LBS | DIASTOLIC BLOOD PRESSURE: 74 MMHG

## 2025-07-16 DIAGNOSIS — O34.219 PREVIOUS CESAREAN SECTION COMPLICATING PREGNANCY (HHS-HCC): ICD-10-CM

## 2025-07-16 DIAGNOSIS — M25.852 FEMOROACETABULAR IMPINGEMENT OF LEFT HIP: ICD-10-CM

## 2025-07-16 DIAGNOSIS — Z3A.38 38 WEEKS GESTATION OF PREGNANCY (HHS-HCC): Primary | ICD-10-CM

## 2025-07-16 DIAGNOSIS — F41.1 GENERALIZED ANXIETY DISORDER: ICD-10-CM

## 2025-07-16 DIAGNOSIS — O99.019 ANEMIA, ANTEPARTUM: ICD-10-CM

## 2025-07-16 DIAGNOSIS — Z34.80 SUPERVISION OF OTHER NORMAL PREGNANCY, ANTEPARTUM (HHS-HCC): ICD-10-CM

## 2025-07-16 PROCEDURE — 0501F PRENATAL FLOW SHEET: CPT | Performed by: OBSTETRICS & GYNECOLOGY

## 2025-07-16 ASSESSMENT — ENCOUNTER SYMPTOMS
DEPRESSION: 0
LOSS OF SENSATION IN FEET: 0
OCCASIONAL FEELINGS OF UNSTEADINESS: 0

## 2025-07-21 ENCOUNTER — ANESTHESIA EVENT (OUTPATIENT)
Dept: OBSTETRICS AND GYNECOLOGY | Facility: HOSPITAL | Age: 25
End: 2025-07-21
Payer: COMMERCIAL

## 2025-07-21 ENCOUNTER — PREP FOR PROCEDURE (OUTPATIENT)
Facility: CLINIC | Age: 25
End: 2025-07-21

## 2025-07-21 ENCOUNTER — ANESTHESIA (OUTPATIENT)
Dept: OBSTETRICS AND GYNECOLOGY | Facility: HOSPITAL | Age: 25
End: 2025-07-21
Payer: COMMERCIAL

## 2025-07-21 ENCOUNTER — APPOINTMENT (OUTPATIENT)
Dept: OBSTETRICS AND GYNECOLOGY | Facility: HOSPITAL | Age: 25
End: 2025-07-21
Payer: COMMERCIAL

## 2025-07-21 ENCOUNTER — HOSPITAL ENCOUNTER (INPATIENT)
Facility: HOSPITAL | Age: 25
LOS: 2 days | Discharge: HOME | End: 2025-07-23
Attending: OBSTETRICS & GYNECOLOGY | Admitting: OBSTETRICS & GYNECOLOGY
Payer: COMMERCIAL

## 2025-07-21 PROBLEM — Z3A.39 PREGNANCY WITH 39 COMPLETED WEEKS GESTATION (HHS-HCC): Status: ACTIVE | Noted: 2025-07-21

## 2025-07-21 LAB
ABO GROUP (TYPE) IN BLOOD: NORMAL
ANTIBODY SCREEN: NORMAL
BB ANTIBODY IDENTIFICATION: NORMAL
CASE #: NORMAL
ERYTHROCYTE [DISTWIDTH] IN BLOOD BY AUTOMATED COUNT: 14.1 % (ref 11.5–14.5)
HCT VFR BLD AUTO: 28.5 % (ref 36–46)
HGB BLD-MCNC: 8.9 G/DL (ref 12–16)
MCH RBC QN AUTO: 23.6 PG (ref 26–34)
MCHC RBC AUTO-ENTMCNC: 31.2 G/DL (ref 32–36)
MCV RBC AUTO: 76 FL (ref 80–100)
NRBC BLD-RTO: 0 /100 WBCS (ref 0–0)
PLATELET # BLD AUTO: 397 X10*3/UL (ref 150–450)
RBC # BLD AUTO: 3.77 X10*6/UL (ref 4–5.2)
RH FACTOR (ANTIGEN D): NORMAL
WBC # BLD AUTO: 13.3 X10*3/UL (ref 4.4–11.3)

## 2025-07-21 PROCEDURE — 86922 COMPATIBILITY TEST ANTIGLOB: CPT

## 2025-07-21 PROCEDURE — 59050 FETAL MONITOR W/REPORT: CPT

## 2025-07-21 PROCEDURE — 36415 COLL VENOUS BLD VENIPUNCTURE: CPT | Performed by: ADVANCED PRACTICE MIDWIFE

## 2025-07-21 PROCEDURE — 3E033VJ INTRODUCTION OF OTHER HORMONE INTO PERIPHERAL VEIN, PERCUTANEOUS APPROACH: ICD-10-PCS | Performed by: OBSTETRICS & GYNECOLOGY

## 2025-07-21 PROCEDURE — 1120000001 HC OB PRIVATE ROOM DAILY

## 2025-07-21 PROCEDURE — 2500000004 HC RX 250 GENERAL PHARMACY W/ HCPCS (ALT 636 FOR OP/ED): Performed by: ADVANCED PRACTICE MIDWIFE

## 2025-07-21 PROCEDURE — 86850 RBC ANTIBODY SCREEN: CPT | Performed by: ADVANCED PRACTICE MIDWIFE

## 2025-07-21 PROCEDURE — 86780 TREPONEMA PALLIDUM: CPT | Mod: GEALAB | Performed by: ADVANCED PRACTICE MIDWIFE

## 2025-07-21 PROCEDURE — 7210000002 HC LABOR PER HOUR

## 2025-07-21 PROCEDURE — 85027 COMPLETE CBC AUTOMATED: CPT | Performed by: ADVANCED PRACTICE MIDWIFE

## 2025-07-21 RX ORDER — OXYTOCIN/0.9 % SODIUM CHLORIDE 30/500 ML
2-30 PLASTIC BAG, INJECTION (ML) INTRAVENOUS CONTINUOUS
Status: DISCONTINUED | OUTPATIENT
Start: 2025-07-21 | End: 2025-07-22

## 2025-07-21 RX ORDER — OXYTOCIN 10 [USP'U]/ML
10 INJECTION, SOLUTION INTRAMUSCULAR; INTRAVENOUS ONCE AS NEEDED
Status: DISCONTINUED | OUTPATIENT
Start: 2025-07-21 | End: 2025-07-22

## 2025-07-21 RX ORDER — MISOPROSTOL 200 UG/1
800 TABLET ORAL ONCE AS NEEDED
Status: COMPLETED | OUTPATIENT
Start: 2025-07-21 | End: 2025-07-22

## 2025-07-21 RX ORDER — OXYTOCIN/0.9 % SODIUM CHLORIDE 30/500 ML
60 PLASTIC BAG, INJECTION (ML) INTRAVENOUS ONCE AS NEEDED
Status: DISCONTINUED | OUTPATIENT
Start: 2025-07-21 | End: 2025-07-22

## 2025-07-21 RX ORDER — CALCIUM CARBONATE 200(500)MG
1 TABLET,CHEWABLE ORAL EVERY 6 HOURS PRN
Status: DISCONTINUED | OUTPATIENT
Start: 2025-07-21 | End: 2025-07-22

## 2025-07-21 RX ORDER — CARBOPROST TROMETHAMINE 250 UG/ML
250 INJECTION, SOLUTION INTRAMUSCULAR ONCE AS NEEDED
Status: DISCONTINUED | OUTPATIENT
Start: 2025-07-21 | End: 2025-07-22

## 2025-07-21 RX ORDER — TERBUTALINE SULFATE 1 MG/ML
0.25 INJECTION SUBCUTANEOUS ONCE AS NEEDED
Status: DISCONTINUED | OUTPATIENT
Start: 2025-07-21 | End: 2025-07-22

## 2025-07-21 RX ORDER — LABETALOL HYDROCHLORIDE 5 MG/ML
20 INJECTION, SOLUTION INTRAVENOUS ONCE AS NEEDED
Status: DISCONTINUED | OUTPATIENT
Start: 2025-07-21 | End: 2025-07-22

## 2025-07-21 RX ORDER — ONDANSETRON 4 MG/1
4 TABLET, FILM COATED ORAL EVERY 6 HOURS PRN
Status: DISCONTINUED | OUTPATIENT
Start: 2025-07-21 | End: 2025-07-22

## 2025-07-21 RX ORDER — HYDRALAZINE HYDROCHLORIDE 20 MG/ML
5 INJECTION INTRAMUSCULAR; INTRAVENOUS ONCE AS NEEDED
Status: DISCONTINUED | OUTPATIENT
Start: 2025-07-21 | End: 2025-07-22

## 2025-07-21 RX ORDER — SODIUM CHLORIDE, SODIUM LACTATE, POTASSIUM CHLORIDE, CALCIUM CHLORIDE 600; 310; 30; 20 MG/100ML; MG/100ML; MG/100ML; MG/100ML
75 INJECTION, SOLUTION INTRAVENOUS CONTINUOUS
Status: DISCONTINUED | OUTPATIENT
Start: 2025-07-21 | End: 2025-07-22

## 2025-07-21 RX ORDER — TRANEXAMIC ACID 1 G/10ML
1000 INJECTION, SOLUTION INTRAVENOUS ONCE AS NEEDED
Status: DISCONTINUED | OUTPATIENT
Start: 2025-07-21 | End: 2025-07-22

## 2025-07-21 RX ORDER — ONDANSETRON HYDROCHLORIDE 2 MG/ML
4 INJECTION, SOLUTION INTRAVENOUS EVERY 6 HOURS PRN
Status: DISCONTINUED | OUTPATIENT
Start: 2025-07-21 | End: 2025-07-22

## 2025-07-21 RX ORDER — LOPERAMIDE HYDROCHLORIDE 2 MG/1
4 CAPSULE ORAL EVERY 2 HOUR PRN
Status: DISCONTINUED | OUTPATIENT
Start: 2025-07-21 | End: 2025-07-22

## 2025-07-21 RX ORDER — METHYLERGONOVINE MALEATE 0.2 MG/ML
0.2 INJECTION INTRAVENOUS ONCE AS NEEDED
Status: DISCONTINUED | OUTPATIENT
Start: 2025-07-21 | End: 2025-07-22

## 2025-07-21 RX ORDER — LIDOCAINE HYDROCHLORIDE 10 MG/ML
20 INJECTION, SOLUTION INFILTRATION; PERINEURAL ONCE AS NEEDED
Status: DISCONTINUED | OUTPATIENT
Start: 2025-07-21 | End: 2025-07-22

## 2025-07-21 RX ADMIN — SODIUM CHLORIDE, SODIUM LACTATE, POTASSIUM CHLORIDE, AND CALCIUM CHLORIDE 75 ML/HR: .6; .31; .03; .02 INJECTION, SOLUTION INTRAVENOUS at 22:21

## 2025-07-21 RX ADMIN — Medication 2 MILLI-UNITS/MIN: at 22:22

## 2025-07-21 SDOH — HEALTH STABILITY: MENTAL HEALTH: WISH TO BE DEAD (PAST 1 MONTH): NO

## 2025-07-21 SDOH — HEALTH STABILITY: MENTAL HEALTH: SUICIDAL BEHAVIOR (LIFETIME): NO

## 2025-07-21 SDOH — SOCIAL STABILITY: SOCIAL INSECURITY: ARE YOU OR HAVE YOU BEEN THREATENED OR ABUSED PHYSICALLY, EMOTIONALLY, OR SEXUALLY BY ANYONE?: NO

## 2025-07-21 SDOH — SOCIAL STABILITY: SOCIAL INSECURITY
WITHIN THE LAST YEAR, HAVE YOU BEEN KICKED, HIT, SLAPPED, OR OTHERWISE PHYSICALLY HURT BY YOUR PARTNER OR EX-PARTNER?: NO

## 2025-07-21 SDOH — SOCIAL STABILITY: SOCIAL INSECURITY: HAVE YOU HAD THOUGHTS OF HARMING ANYONE ELSE?: NO

## 2025-07-21 SDOH — SOCIAL STABILITY: SOCIAL INSECURITY
WITHIN THE LAST YEAR, HAVE YOU BEEN RAPED OR FORCED TO HAVE ANY KIND OF SEXUAL ACTIVITY BY YOUR PARTNER OR EX-PARTNER?: NO

## 2025-07-21 SDOH — SOCIAL STABILITY: SOCIAL INSECURITY: WITHIN THE LAST YEAR, HAVE YOU BEEN HUMILIATED OR EMOTIONALLY ABUSED IN OTHER WAYS BY YOUR PARTNER OR EX-PARTNER?: NO

## 2025-07-21 SDOH — SOCIAL STABILITY: SOCIAL INSECURITY: ARE THERE ANY APPARENT SIGNS OF INJURIES/BEHAVIORS THAT COULD BE RELATED TO ABUSE/NEGLECT?: NO

## 2025-07-21 SDOH — SOCIAL STABILITY: SOCIAL INSECURITY: DO YOU FEEL ANYONE HAS EXPLOITED OR TAKEN ADVANTAGE OF YOU FINANCIALLY OR OF YOUR PERSONAL PROPERTY?: NO

## 2025-07-21 SDOH — ECONOMIC STABILITY: FOOD INSECURITY: WITHIN THE PAST 12 MONTHS, THE FOOD YOU BOUGHT JUST DIDN'T LAST AND YOU DIDN'T HAVE MONEY TO GET MORE.: NEVER TRUE

## 2025-07-21 SDOH — HEALTH STABILITY: MENTAL HEALTH: NON-SPECIFIC ACTIVE SUICIDAL THOUGHTS (PAST 1 MONTH): NO

## 2025-07-21 SDOH — ECONOMIC STABILITY: FOOD INSECURITY: WITHIN THE PAST 12 MONTHS, YOU WORRIED THAT YOUR FOOD WOULD RUN OUT BEFORE YOU GOT THE MONEY TO BUY MORE.: NEVER TRUE

## 2025-07-21 SDOH — SOCIAL STABILITY: SOCIAL INSECURITY: WITHIN THE LAST YEAR, HAVE YOU BEEN AFRAID OF YOUR PARTNER OR EX-PARTNER?: NO

## 2025-07-21 SDOH — SOCIAL STABILITY: SOCIAL INSECURITY: HAS ANYONE EVER THREATENED TO HURT YOUR FAMILY OR YOUR PETS?: NO

## 2025-07-21 SDOH — ECONOMIC STABILITY: HOUSING INSECURITY: DO YOU FEEL UNSAFE GOING BACK TO THE PLACE WHERE YOU ARE LIVING?: NO

## 2025-07-21 SDOH — SOCIAL STABILITY: SOCIAL INSECURITY: PHYSICAL ABUSE: DENIES

## 2025-07-21 SDOH — HEALTH STABILITY: MENTAL HEALTH: WERE YOU ABLE TO COMPLETE ALL THE BEHAVIORAL HEALTH SCREENINGS?: YES

## 2025-07-21 SDOH — SOCIAL STABILITY: SOCIAL INSECURITY: DOES ANYONE TRY TO KEEP YOU FROM HAVING/CONTACTING OTHER FRIENDS OR DOING THINGS OUTSIDE YOUR HOME?: NO

## 2025-07-21 SDOH — SOCIAL STABILITY: SOCIAL INSECURITY: ABUSE SCREEN: ADULT

## 2025-07-21 SDOH — SOCIAL STABILITY: SOCIAL INSECURITY: VERBAL ABUSE: DENIES

## 2025-07-21 ASSESSMENT — LIFESTYLE VARIABLES
AUDIT-C TOTAL SCORE: 0
HOW OFTEN DO YOU HAVE 6 OR MORE DRINKS ON ONE OCCASION: NEVER
HOW MANY STANDARD DRINKS CONTAINING ALCOHOL DO YOU HAVE ON A TYPICAL DAY: PATIENT DOES NOT DRINK
SKIP TO QUESTIONS 9-10: 1
AUDIT-C TOTAL SCORE: 0
HOW OFTEN DO YOU HAVE A DRINK CONTAINING ALCOHOL: NEVER

## 2025-07-21 ASSESSMENT — ACTIVITIES OF DAILY LIVING (ADL): LACK_OF_TRANSPORTATION: NO

## 2025-07-21 ASSESSMENT — PAIN SCALES - GENERAL
PAINLEVEL_OUTOF10: 0 - NO PAIN
PAINLEVEL_OUTOF10: 4

## 2025-07-22 ENCOUNTER — ANESTHESIA (OUTPATIENT)
Dept: OBSTETRICS AND GYNECOLOGY | Facility: HOSPITAL | Age: 25
End: 2025-07-22
Payer: COMMERCIAL

## 2025-07-22 ENCOUNTER — ANESTHESIA EVENT (OUTPATIENT)
Dept: OBSTETRICS AND GYNECOLOGY | Facility: HOSPITAL | Age: 25
End: 2025-07-22
Payer: COMMERCIAL

## 2025-07-22 PROBLEM — Z34.80 SUPERVISION OF OTHER NORMAL PREGNANCY, ANTEPARTUM (HHS-HCC): Status: RESOLVED | Noted: 2025-02-12 | Resolved: 2025-07-22

## 2025-07-22 PROBLEM — O34.219 PREVIOUS CESAREAN SECTION COMPLICATING PREGNANCY (HHS-HCC): Status: RESOLVED | Noted: 2025-02-12 | Resolved: 2025-07-22

## 2025-07-22 PROBLEM — Z3A.39 PREGNANCY WITH 39 COMPLETED WEEKS GESTATION (HHS-HCC): Status: RESOLVED | Noted: 2025-07-21 | Resolved: 2025-07-22

## 2025-07-22 PROBLEM — Z3A.38 38 WEEKS GESTATION OF PREGNANCY (HHS-HCC): Status: RESOLVED | Noted: 2025-03-11 | Resolved: 2025-07-22

## 2025-07-22 LAB
FETAL MATERNAL SCREEN: NORMAL
TREPONEMA PALLIDUM IGG+IGM AB [PRESENCE] IN SERUM OR PLASMA BY IMMUNOASSAY: NONREACTIVE

## 2025-07-22 PROCEDURE — 36415 COLL VENOUS BLD VENIPUNCTURE: CPT | Performed by: OBSTETRICS & GYNECOLOGY

## 2025-07-22 PROCEDURE — 7100000016 HC LABOR RECOVERY PER HOUR

## 2025-07-22 PROCEDURE — 1120000001 HC OB PRIVATE ROOM DAILY

## 2025-07-22 PROCEDURE — 2500000001 HC RX 250 WO HCPCS SELF ADMINISTERED DRUGS (ALT 637 FOR MEDICARE OP): Performed by: OBSTETRICS & GYNECOLOGY

## 2025-07-22 PROCEDURE — 3700000014 EPIDURAL BLOCK: Performed by: NURSE ANESTHETIST, CERTIFIED REGISTERED

## 2025-07-22 PROCEDURE — 2500000002 HC RX 250 W HCPCS SELF ADMINISTERED DRUGS (ALT 637 FOR MEDICARE OP, ALT 636 FOR OP/ED): Performed by: ADVANCED PRACTICE MIDWIFE

## 2025-07-22 PROCEDURE — 59409 OBSTETRICAL CARE: CPT | Performed by: OBSTETRICS & GYNECOLOGY

## 2025-07-22 PROCEDURE — 51701 INSERT BLADDER CATHETER: CPT

## 2025-07-22 PROCEDURE — 86870 RBC ANTIBODY IDENTIFICATION: CPT

## 2025-07-22 PROCEDURE — 2500000001 HC RX 250 WO HCPCS SELF ADMINISTERED DRUGS (ALT 637 FOR MEDICARE OP): Performed by: ADVANCED PRACTICE MIDWIFE

## 2025-07-22 PROCEDURE — 7210000002 HC LABOR PER HOUR

## 2025-07-22 PROCEDURE — 2500000004 HC RX 250 GENERAL PHARMACY W/ HCPCS (ALT 636 FOR OP/ED): Performed by: NURSE ANESTHETIST, CERTIFIED REGISTERED

## 2025-07-22 PROCEDURE — 2500000004 HC RX 250 GENERAL PHARMACY W/ HCPCS (ALT 636 FOR OP/ED): Performed by: OBSTETRICS & GYNECOLOGY

## 2025-07-22 PROCEDURE — 85461 HEMOGLOBIN FETAL: CPT

## 2025-07-22 PROCEDURE — 01967 NEURAXL LBR ANES VAG DLVR: CPT | Performed by: NURSE ANESTHETIST, CERTIFIED REGISTERED

## 2025-07-22 RX ORDER — ONDANSETRON 4 MG/1
4 TABLET, FILM COATED ORAL EVERY 6 HOURS PRN
Status: DISCONTINUED | OUTPATIENT
Start: 2025-07-22 | End: 2025-07-23 | Stop reason: HOSPADM

## 2025-07-22 RX ORDER — ADHESIVE BANDAGE
10 BANDAGE TOPICAL
Status: DISCONTINUED | OUTPATIENT
Start: 2025-07-22 | End: 2025-07-23 | Stop reason: HOSPADM

## 2025-07-22 RX ORDER — ACETAMINOPHEN 160 MG/5ML
650 SOLUTION ORAL ONCE
Status: COMPLETED | OUTPATIENT
Start: 2025-07-22 | End: 2025-07-22

## 2025-07-22 RX ORDER — FENTANYL/ROPIVACAINE/NS/PF 2MCG/ML-.2
0-25 PLASTIC BAG, INJECTION (ML) INJECTION CONTINUOUS
Status: DISCONTINUED | OUTPATIENT
Start: 2025-07-22 | End: 2025-07-22

## 2025-07-22 RX ORDER — MISOPROSTOL 200 UG/1
800 TABLET ORAL ONCE AS NEEDED
Status: DISCONTINUED | OUTPATIENT
Start: 2025-07-22 | End: 2025-07-23 | Stop reason: HOSPADM

## 2025-07-22 RX ORDER — ONDANSETRON HYDROCHLORIDE 2 MG/ML
4 INJECTION, SOLUTION INTRAVENOUS EVERY 6 HOURS PRN
Status: DISCONTINUED | OUTPATIENT
Start: 2025-07-22 | End: 2025-07-23 | Stop reason: HOSPADM

## 2025-07-22 RX ORDER — SIMETHICONE 80 MG
80 TABLET,CHEWABLE ORAL 4 TIMES DAILY PRN
Status: DISCONTINUED | OUTPATIENT
Start: 2025-07-22 | End: 2025-07-23 | Stop reason: HOSPADM

## 2025-07-22 RX ORDER — ACETAMINOPHEN 325 MG/1
650 TABLET ORAL EVERY 4 HOURS PRN
Status: DISCONTINUED | OUTPATIENT
Start: 2025-07-22 | End: 2025-07-23 | Stop reason: HOSPADM

## 2025-07-22 RX ORDER — LABETALOL HYDROCHLORIDE 5 MG/ML
20 INJECTION, SOLUTION INTRAVENOUS ONCE AS NEEDED
Status: DISCONTINUED | OUTPATIENT
Start: 2025-07-22 | End: 2025-07-23 | Stop reason: HOSPADM

## 2025-07-22 RX ORDER — BUSPIRONE HYDROCHLORIDE 5 MG/1
10 TABLET ORAL 2 TIMES DAILY
Status: DISCONTINUED | OUTPATIENT
Start: 2025-07-22 | End: 2025-07-23 | Stop reason: HOSPADM

## 2025-07-22 RX ORDER — DIPHENHYDRAMINE HCL 25 MG
25 CAPSULE ORAL EVERY 6 HOURS PRN
Status: DISCONTINUED | OUTPATIENT
Start: 2025-07-22 | End: 2025-07-23 | Stop reason: HOSPADM

## 2025-07-22 RX ORDER — METHYLERGONOVINE MALEATE 0.2 MG/ML
0.2 INJECTION INTRAVENOUS ONCE AS NEEDED
Status: DISCONTINUED | OUTPATIENT
Start: 2025-07-22 | End: 2025-07-23 | Stop reason: HOSPADM

## 2025-07-22 RX ORDER — LOPERAMIDE HYDROCHLORIDE 2 MG/1
4 CAPSULE ORAL EVERY 2 HOUR PRN
Status: DISCONTINUED | OUTPATIENT
Start: 2025-07-22 | End: 2025-07-23 | Stop reason: HOSPADM

## 2025-07-22 RX ORDER — ACETAMINOPHEN 325 MG/1
650 TABLET ORAL ONCE
Status: COMPLETED | OUTPATIENT
Start: 2025-07-22 | End: 2025-07-22

## 2025-07-22 RX ORDER — ONDANSETRON HYDROCHLORIDE 2 MG/ML
4 INJECTION, SOLUTION INTRAVENOUS EVERY 6 HOURS PRN
Status: DISCONTINUED | OUTPATIENT
Start: 2025-07-22 | End: 2025-07-22 | Stop reason: SDUPTHER

## 2025-07-22 RX ORDER — TRIPROLIDINE/PSEUDOEPHEDRINE 2.5MG-60MG
600 TABLET ORAL EVERY 6 HOURS PRN
Status: DISCONTINUED | OUTPATIENT
Start: 2025-07-22 | End: 2025-07-23 | Stop reason: HOSPADM

## 2025-07-22 RX ORDER — CARBOPROST TROMETHAMINE 250 UG/ML
250 INJECTION, SOLUTION INTRAMUSCULAR ONCE AS NEEDED
Status: DISCONTINUED | OUTPATIENT
Start: 2025-07-22 | End: 2025-07-23 | Stop reason: HOSPADM

## 2025-07-22 RX ORDER — DIPHENHYDRAMINE HYDROCHLORIDE 50 MG/ML
25 INJECTION, SOLUTION INTRAMUSCULAR; INTRAVENOUS EVERY 6 HOURS PRN
Status: DISCONTINUED | OUTPATIENT
Start: 2025-07-22 | End: 2025-07-23 | Stop reason: HOSPADM

## 2025-07-22 RX ORDER — ACETAMINOPHEN 325 MG/1
975 TABLET ORAL EVERY 6 HOURS
Status: DISCONTINUED | OUTPATIENT
Start: 2025-07-22 | End: 2025-07-22

## 2025-07-22 RX ORDER — DIPHENHYDRAMINE HYDROCHLORIDE 50 MG/ML
50 INJECTION, SOLUTION INTRAMUSCULAR; INTRAVENOUS EVERY 5 MIN PRN
Status: DISCONTINUED | OUTPATIENT
Start: 2025-07-22 | End: 2025-07-23 | Stop reason: HOSPADM

## 2025-07-22 RX ORDER — TRANEXAMIC ACID 1 G/10ML
1000 INJECTION, SOLUTION INTRAVENOUS ONCE AS NEEDED
Status: DISCONTINUED | OUTPATIENT
Start: 2025-07-22 | End: 2025-07-23 | Stop reason: HOSPADM

## 2025-07-22 RX ORDER — POLYETHYLENE GLYCOL 3350 17 G/17G
17 POWDER, FOR SOLUTION ORAL 2 TIMES DAILY PRN
Status: DISCONTINUED | OUTPATIENT
Start: 2025-07-22 | End: 2025-07-23 | Stop reason: HOSPADM

## 2025-07-22 RX ORDER — BUPIVACAINE HYDROCHLORIDE 2.5 MG/ML
INJECTION, SOLUTION EPIDURAL; INFILTRATION; INTRACAUDAL; PERINEURAL AS NEEDED
Status: DISCONTINUED | OUTPATIENT
Start: 2025-07-22 | End: 2025-07-22

## 2025-07-22 RX ORDER — HYDRALAZINE HYDROCHLORIDE 20 MG/ML
5 INJECTION INTRAMUSCULAR; INTRAVENOUS ONCE AS NEEDED
Status: DISCONTINUED | OUTPATIENT
Start: 2025-07-22 | End: 2025-07-23 | Stop reason: HOSPADM

## 2025-07-22 RX ORDER — OXYTOCIN 10 [USP'U]/ML
10 INJECTION, SOLUTION INTRAMUSCULAR; INTRAVENOUS ONCE AS NEEDED
Status: DISCONTINUED | OUTPATIENT
Start: 2025-07-22 | End: 2025-07-23 | Stop reason: HOSPADM

## 2025-07-22 RX ORDER — ACETAMINOPHEN 160 MG/5ML
650 SOLUTION ORAL EVERY 4 HOURS PRN
Status: DISCONTINUED | OUTPATIENT
Start: 2025-07-22 | End: 2025-07-23 | Stop reason: HOSPADM

## 2025-07-22 RX ORDER — OXYTOCIN/0.9 % SODIUM CHLORIDE 30/500 ML
60 PLASTIC BAG, INJECTION (ML) INTRAVENOUS ONCE AS NEEDED
Status: DISCONTINUED | OUTPATIENT
Start: 2025-07-22 | End: 2025-07-23 | Stop reason: HOSPADM

## 2025-07-22 RX ORDER — IBUPROFEN 600 MG/1
600 TABLET, FILM COATED ORAL EVERY 6 HOURS
Status: DISCONTINUED | OUTPATIENT
Start: 2025-07-22 | End: 2025-07-22

## 2025-07-22 RX ORDER — ENOXAPARIN SODIUM 100 MG/ML
40 INJECTION SUBCUTANEOUS EVERY 24 HOURS
Status: DISCONTINUED | OUTPATIENT
Start: 2025-07-22 | End: 2025-07-23 | Stop reason: HOSPADM

## 2025-07-22 RX ADMIN — IBUPROFEN 600 MG: 100 SUSPENSION ORAL at 22:52

## 2025-07-22 RX ADMIN — BUPIVACAINE HYDROCHLORIDE 2 ML: 2.5 INJECTION, SOLUTION EPIDURAL; INFILTRATION; INTRACAUDAL at 06:02

## 2025-07-22 RX ADMIN — IRON SUCROSE 200 MG: 20 INJECTION, SOLUTION INTRAVENOUS at 19:37

## 2025-07-22 RX ADMIN — RHO(D) IMMUNE GLOBULIN (HUMAN) 300 MCG: 1500 SOLUTION INTRAMUSCULAR at 17:53

## 2025-07-22 RX ADMIN — MISOPROSTOL 800 MCG: 200 TABLET ORAL at 08:53

## 2025-07-22 RX ADMIN — Medication 10 ML/HR: at 06:06

## 2025-07-22 RX ADMIN — ACETAMINOPHEN 650 MG: 650 SOLUTION ORAL at 22:52

## 2025-07-22 RX ADMIN — SIMETHICONE 80 MG: 80 TABLET, CHEWABLE ORAL at 19:37

## 2025-07-22 RX ADMIN — POLYETHYLENE GLYCOL 3350 17 G: 17 POWDER, FOR SOLUTION ORAL at 23:10

## 2025-07-22 RX ADMIN — ACETAMINOPHEN 650 MG: 650 SOLUTION ORAL at 04:12

## 2025-07-22 RX ADMIN — BUPIVACAINE HYDROCHLORIDE 2 ML: 2.5 INJECTION, SOLUTION EPIDURAL; INFILTRATION; INTRACAUDAL at 06:04

## 2025-07-22 ASSESSMENT — PAIN SCALES - GENERAL
PAINLEVEL_OUTOF10: 0 - NO PAIN
PAINLEVEL_OUTOF10: 0 - NO PAIN
PAINLEVEL_OUTOF10: 2
PAINLEVEL_OUTOF10: 5 - MODERATE PAIN
PAIN_LEVEL: 0

## 2025-07-22 ASSESSMENT — PAIN DESCRIPTION - DESCRIPTORS: DESCRIPTORS: SORE

## 2025-07-22 NOTE — CARE PLAN
The patient's goals for the shift include safe and comfortable delivery of a healthy baby    The clinical goals for the shift include FHR will remain category 1 throughout induction of labor     of vigorous female . Comfortable with epidural. Stable during recovery. Denies pain  Problem: Postpartum  Goal: Experiences normal postpartum course  Outcome: Progressing  Goal: Appropriate maternal -  bonding  Outcome: Progressing  Goal: Establish and maintain infant feeding pattern for adequate nutrition  Outcome: Progressing  Goal: Incisions, wounds, or drain sites healing without S/S of infection  Outcome: Progressing     Problem: Pain - Adult  Goal: Verbalizes/displays adequate comfort level or baseline comfort level  Outcome: Progressing     Problem: Vaginal Birth or  Section  Goal: Fetal and maternal status remain reassuring during the birth process  Outcome: Met  Goal: Prevention of malpresentation/labor dystocia through delivery  Outcome: Met  Goal: Demonstrates labor coping techniques through delivery  Outcome: Met  Goal: Minimal s/sx of HDP and BP<160/110  Outcome: Met  Goal: No s/sx of infection through recovery  Outcome: Met  Goal: No s/sx of hemorrhage through recovery  Outcome: Met     Problem: Postpartum  Goal: No s/sx infection  Outcome: Met  Goal: No s/sx of hemorrhage  Outcome: Met  Goal: Minimal s/sx of HDP and BP<160/110  Outcome: Met

## 2025-07-22 NOTE — LACTATION NOTE
This note was copied from a baby's chart.  Lactation Consultant Note   25 1050   Lactation Consultation   Reason for Consult Initial assessment;Other (Comment)  (uncertain about goals, would like to pump and feed expressed breast milk and infant formula by bottle at this time)   Consultant Name Vladimir Morales RN IBCLC   Maternal Information   Has mother  before? No  (pumped and fed for up to 3 months with her last baby)   Infant to breast within first 2 hours of birth? No   Breastfeeding Delayed Due to   (mother requested a bottle of infant formula for first feeding)   Exclusive Pump and Bottle Feed Yes   WIC Program Yes  ([facility])   Maternal Assessment   Breast Assessment Medium;Compressible   Nipple Assessment Intact;Erect   Areola Assessment Normal   Infant Assessment   Infant Behavior Active alert;Sucking  (on pacifier)   Infant Assessment   (full term infant, born vaginally)   Feeding Assessment   Nutrition Source Formula (per mother’s request)   Breast Pump   Pump Hospital grade electric pump;Double breast pumping   Frequency   (first pumping session at 1115, encouraged at least 8 x in 24 hours)   Duration Initiate phase   Breast Shield Size and Type   (18 mm, reports using 19 mm with her last baby, reports 21 mm flanges were too big and caused breakdown in her nipples)   Volume of Milk Production   (obtained 2 mls at first pumping session)   Units of Volume mL per session   Patient Follow-Up   Inpatient Lactation Follow-up Needed  Yes   Outpatient Lactation Follow-up Recommended  (reviewed resources, patient did not feel like she received great support through her WIC agency with last baby)   Other OB Lactation Documentation    Maternal Risk Factors   (anxiety-on Buspar)     1050: 27 year old  mother. Met with mother and father for initial lactation consult to review goals, address any questions and/or concerns and to offer lactation assistance, support and education as needed and  "desired. Verbalizes goal of pumping and feeding EBM. Reports pumping and feeding with her older children for up to 3 weeks. Reports having issues with elevated lipase in her breast milk with her now 2 year old.  States her milk smelled very \"soapy\" and the baby would not drink it. She also reports hx of allergies in her older children, requiring specialized formulas. She is concerned about this baby developing allergies as well. She does verbalize goal of pumping and providing expressed breast milk as able and as baby tolerates. Reports breast changes during pregnancy. Denies history of breast or nipple surgery.    Symphony pump set up at bedside. Assisted with first pumping session. Mother reports using 19 mm flanges with her last baby. Reports 21 mm flanges caused discomfort and skin breakdown. 18 mm flanges available here, mother agreeable to try those first. Plans to bring in her own inserts from home as needed.     Lactation handouts provided. Lactation education (specifically regarding pumping) and support provided throughout consult. Mother and father provided with the opportunity to ask questions. All questions answered. See education flow sheet for detailed list of education topics covered. Reviewed importance of frequent skin to skin contact, waking techniques, infant stomach capacity, value of colostrum feeds and typical  feeding behaviors in the first 24 hours. Encouraged pumping every 2-3 hours or at least 8 x per 24 hours. Reviewed milk storage guidelines, correct flange fit, nipple care and cleaning of pump parts. Reviewed signs of adequate intake as well as paced bottle feeding. Mother is supplementing with infant formula, provided safe formula preparation and feeding education booklet. Mother denies any further questions or concerns at this time. Offered ongoing lactation assistance, support and education as needed and desired.    1200: Mother obtained 2 mls in first pumping session. She does " report pumping was slightly uncomfortable so she lowered the vacuum. Reviewed pumping education and tips to make pumping more comfortable. States her mother is bringing in her flange inserts from home. Discussed lubrication of flanges as needed. Reviewed education on frequency and duration of pumping. Denies any further questions or concerns at this time.

## 2025-07-22 NOTE — DISCHARGE SUMMARY
Discharge Summary    Admission Date: 7/21/2025  Discharge Date: 7/23/2025    Discharge Diagnosis  Vaginal delivery ([facility])    Hospital Course  Delivery Date: 7/22/2025 8:50 AM  Delivery type: Vaginal, Spontaneous   GA at delivery: 39w5d  Outcome: Living  Anesthesia during delivery: Epidural  Intrapartum complications: None  Feeding method: Breastfeeding Status: Unknown     Procedures: none  Contraception at discharge: condoms      Pertinent Physical Exam At Time of Discharge    Last Vitals:  Temp Pulse Resp BP MAP Pulse Ox   37.3 °C (99.1 °F) 100 18 116/65 82 98 %     Discharge Meds     Your medication list        START taking these medications        Instructions Last Dose Given Next Dose Due   ferrous sulfate 325 mg (65 mg elemental) tablet      Take 1 tablet (325 mg) by mouth 2 times a day. Every other day              CONTINUE taking these medications        Instructions Last Dose Given Next Dose Due   PRENATAL ORAL                  STOP taking these medications      busPIRone 10 mg tablet  Commonly known as: Buspar        omeprazole 40 mg DR capsule  Commonly known as: PriLOSEC                Complications Requiring Follow-Up  Routine pp care    Test Results Pending At Discharge  Pending Labs       No current pending labs.          Outpatient Follow-Up  Future Appointments   Date Time Provider Department Center   1/5/2026  9:15 AM Ebenezer Carpio DO [email or web address]     Rd Pizarro MD

## 2025-07-22 NOTE — ANESTHESIA PROCEDURE NOTES
Epidural Block    Patient location during procedure: OB  Start time: 7/22/2025 5:50 AM  End time: 7/22/2025 5:57 AM  Reason for block: labor analgesia  Staffing  Performed: CRNA   Authorized by: MARTINE Perea    Performed by: MARTINE Perea    Preanesthetic Checklist  Completed: patient identified, IV checked, risks and benefits discussed, surgical consent, pre-op evaluation, timeout performed and sterile techniques followed  Block Timeout  RN/Licensed healthcare professional reads aloud to the Anesthesia provider and entire team: Patient identity, procedure with side and site, patient position, and as applicable the availability of implants/special equipment/special requirements.  Patient on coagulant treatment: no  Timeout performed at: 7/22/2025 5:50 AM  Block Placement  Patient position: sitting  Prep: ChloraPrep  Sterility prep: drape, gloves and mask  Sedation level: no sedation  Patient monitoring: blood pressure, continuous pulse oximetry and heart rate  Approach: midline  Local numbing: lidocaine 1% to skin and subcutaneous tissues  Vertebral space: lumbar  Lumbar location: L4-L5  Epidural  Loss of resistance technique: saline  Guidance: landmark technique        Needle  Needle type: Tuohy   Needle gauge: 18  Catheter type: multi-orifice  Catheter size: 20 G  Catheter at skin depth: 12 cm  Catheter securement method: clear occlusive dressing    Test dose: lidocaine 1.5% with epinephrine 1-to-200,000  Test dose: lidocaine 1.5% with epinephrine 1-to-200,000  Test dose result: no positive test dose    PCEA  Medication concentration used: 0.2% Ropivacaine with 2 mcg/mL Fentanyl  Dose (mL): 5  Lockout (minutes): 20  1-Hour Limit (boluses/hr): 3  Basal Rate: 10        Assessment  Block outcome: patient comfortable  Number of attempts: 1  Events: no positive test dose  Procedure assessment: patient tolerated procedure well with no immediate complications

## 2025-07-22 NOTE — ANESTHESIA PREPROCEDURE EVALUATION
Patient: Geno Julien    Evaluation Method: In-person visit    Procedure Information    Date: 25  Procedure: Labor Consult         Relevant Problems   Neuro   (+) Generalized anxiety disorder      GI   (+) GERD without esophagitis      Endocrine   (+) Multiple thyroid nodules      Hematology   (+) Anemia, antepartum      Musculoskeletal  Pt has had multiple L hip surgeries with pre-existing residual numbness/tingling in thigh and surrounding area near previous incision.      Skin   (+) Flexural eczema      GYN   (+) 38 weeks gestation of pregnancy ([facility])   (+) Supervision of other normal pregnancy, antepartum ([facility])       Clinical information reviewed:    Allergies  Meds               NPO Detail:  No data recorded     OB/Gyn Evaluation    Present Pregnancy    Patient is pregnant now.   Obstetric History                Physical Exam    Airway  Mallampati: II  TM distance: >3 FB  Neck ROM: full  Mouth openin finger widths     Cardiovascular    Dental - normal exam     Pulmonary    Abdominal            Anesthesia Plan    History of general anesthesia?: no  History of complications of general anesthesia?: no    ASA 2     epidural     Anesthetic plan and risks discussed with patient.    Plan discussed with CRNA.

## 2025-07-22 NOTE — ANESTHESIA POSTPROCEDURE EVALUATION
Patient: Geno Julien    Procedure Summary       Date: 07/22/25 Room / Location:     Anesthesia Start: 0550 Anesthesia Stop: 0850    Procedure: Labor Analgesia Diagnosis:     Scheduled Providers:  Responsible Provider: MARTINE Simms    Anesthesia Type: epidural ASA Status: 2            Anesthesia Type: epidural    Vitals Value Taken Time   /67 07/22/25 11:42   Temp 37 07/22/25 11:42   Pulse 77 07/22/25 11:42   Resp 16 07/22/25 11:42   SpO2 100 07/22/25 11:42       Anesthesia Post Evaluation    Patient location during evaluation: bedside  Patient participation: complete - patient participated  Level of consciousness: awake and alert  Pain score: 0  Pain management: adequate  Airway patency: patent  Cardiovascular status: acceptable  Respiratory status: acceptable  Hydration status: acceptable  Postoperative Nausea and Vomiting: none        No notable events documented.

## 2025-07-22 NOTE — ANESTHESIA PREPROCEDURE EVALUATION
Patient: Geno Julien    Evaluation Method: In-person visit    Procedure Information    Date: 25  Procedure: Labor Consult         Relevant Problems   Neuro   (+) Generalized anxiety disorder      GI   (+) GERD without esophagitis      Endocrine   (+) Multiple thyroid nodules      Hematology   (+) Anemia, antepartum      Skin   (+) Flexural eczema      GYN   (+) 38 weeks gestation of pregnancy ([facility])   (+) Supervision of other normal pregnancy, antepartum ([facility])       Clinical information reviewed:    Allergies                NPO Detail:  No data recorded     OB/Gyn Evaluation    Present Pregnancy    Patient is pregnant now.   Obstetric History                Physical Exam    Airway  Mallampati: II  TM distance: >3 FB  Neck ROM: full  Mouth openin finger widths     Cardiovascular    Dental - normal exam     Pulmonary    Abdominal            Anesthesia Plan    History of general anesthesia?: no  History of complications of general anesthesia?: no    ASA 2     epidural     Anesthetic plan and risks discussed with patient.    Plan discussed with CRNA.        
Lip laceration, initial encounter

## 2025-07-22 NOTE — H&P
OB Admission H&P    Assessment/Plan    Geno Julien is a 25 y.o.  at 39w4d, OLESYA: 2025, by Ultrasound, who presents for Induction of Labor.    Plan  -Admit to L&D, consented  -T&S, CBC, and Syphilis  -Epidural at patient request  -Recheck as clinically indicated by maternal or fetal status  -Plan to initiate induction with pitocin  - Dr. De León updated with admission, history, and plan of care  - Anemia noted, type and cross matched x 2 units    Lengthy discussion with pt regarding her options of TOLAC vs repeat C/S.  Discussed benefit of  including quicker recovery, however also discussed risks in detail including a <1% risk of uterine rupture with potential catastrophic results including maternal hemorrhage and fetal oxygen deprivation.  Also discussed that pitocin induction/augmentation would increase that risk.  Pt wishes to proceed with TOLAC at this time.  She is aware she may change her mind at any time.     Fetal Status  -NST reactive, reassuring   -Presentation vertex based on vaginal exam  -EFW 8.7# by leopolds  -GBS neg    Postpartum  Contraception Plan: patient declined    Pregnancy Problems (from 24 to present)       Problem Noted Diagnosed Resolved    Pregnancy with 39 completed weeks gestation ([facility]) 2025 by REGIS Garcia  No    Priority:  Medium       Anemia, antepartum 2025 by REGIS Garcia  No    Priority:  Medium       Overview Signed 2025  4:42 PM by REGIS Garcia   5/6: h/h 10.4/32.1, started po iron, repeat CBC retic @ next visit         38 weeks gestation of pregnancy ([facility]) 3/11/2025 by Rd Pizarro MD  No    Priority:  Medium       Overview Addendum 2025 12:31 PM by Rd Pizarro MD   -  -dating by 6 wk US  -torn labrum, chronic follows with ortho  -RH NEG  -desires cfDNA  -LTCS for partial abruption    Desired provider in labor: [] CNM  [] Physician   [x] Either  Acceptable  [x] Blood Products: [] Yes, accepts [] No, needs counseling  [x] Initial BMI: 22.49   [x] Prenatal Labs:   [x] Cervical Cancer Screening up to date:   NORMAL  [x] Rh status: A NEG  [x] Screen for IPV and Substance Use Risk: NEG  [x] Genetic Screening (cfDNA):  DESIRES  [x] First Trimester Anatomy Screen (11-13.6 wks): DECLINED  [x] Baby ASA (initiated):  STARTED  [x] Pregnancy dated by: 6 WK US    [x] Anatomy US: (19-20 wks) REVIEWED, F/U ACI  [x] Federal Sterilization consent signed (if indicated): N/A  [x] 1hr GCT at 24-28wks: 79  [x] Rhogam (if indicated): Rouleaux present, all clinically significant antibodies ruled out.  Received rhogam 2025  [x] Fetal Surveillance (if indicated): Growth scan,  counseling: Appropriate fetal growth: 2988 g at the 71%   [x] Tdap (27-32 wks, may be given up to 36 wks if initial window missed): declines    [x] Feeding Intentions: Breast  [x] Postpartum Birth control method: considering vasectomy  [x] GBS at 36 - 37 wks:  [x] 39 weeks discussion of IOL vs. Expectant management: desires IOL  [x] Mode of delivery ( anticipated ): hoping for          Supervision of other normal pregnancy, antepartum ([facility]) 2025 by Rd Pizarro MD  No    Priority:  Medium       Overview Addendum 2025 12:10 PM by Rd Pizarro MD      x 2  LTCS x 1  SAB x 1                 Subjective   Good fetal movement.  Denies vaginal bleeding., Denies contractions., Denies leaking of fluid.      Prenatal Provider Juarez    OB History    Para Term  AB Living   5 3 3 0 1 3   SAB IAB Ectopic Multiple Live Births   1 0 0 0 3      # Outcome Date GA Lbr Harry/2nd Weight Sex Type Anes PTL Lv   5 Current            4 Term 23 37w0d  3.232 kg M CS-LTranv Spinal  HERBERTH      Complications: Abruptio Placenta   3 Term 21 39w0d  2.92 kg M Vag-Spont EPI  HERBERTH   2 Term 18 39w0d  3.033 kg M Vag-Spont EPI  HERBERTH   1 SAB                 Surgical History[1]    Social History     Tobacco Use    Smoking status: Never    Smokeless tobacco: Never   Substance Use Topics    Alcohol use: Not Currently       Allergies[2]    Prescriptions Prior to Admission[3]  Objective     Last Vitals  Temp Pulse Resp BP MAP O2 Sat   36.6 °C (97.9 °F) 100 16 116/73 88 99 %     Blood Pressures         20255             BP: 116/73             Physical Exam  General: NAD, mood appropriate  Cardiopulmonary: warm and well perfused, breathing comfortably on room air  Abdomen: Gravid, non-tender  Extremities: Symmetric  Speculum Exam: deferred  Cervix: 3  /70  /-2      Chaperone Present: Yes.  Chaperone Name/Title: VALENTÍN Foley  Examination Chaperoned: Entire Physical Exam     Fetal Monitoring  Baseline: 140 bpm, Variability: moderate,  Accelerations: present and Decelerations: none  Uterine Activity: No contractions seen on toco  Interpretation: Category one    Bedside ultrasound: No    Labs in chart were reviewed.          Prenatal labs reviewed, not remarkable.             [1]   Past Surgical History:  Procedure Laterality Date     SECTION, LOW TRANSVERSE      OTHER SURGICAL HISTORY  2019    Hip surgery X3   [2]   Allergies  Allergen Reactions    Azithromycin Anaphylaxis    Sertraline Anaphylaxis    Shellfish Containing Products Hives   [3]   Medications Prior to Admission   Medication Sig Dispense Refill Last Dose/Taking    busPIRone (Buspar) 10 mg tablet Take 1 tablet (10 mg) by mouth 2 times a day. For 30 days       ferrous sulfate 325 mg (65 mg elemental) tablet Take 1 tablet (325 mg) by mouth 2 times a day. Every other day 60 tablet 3     omeprazole (PriLOSEC) 40 mg DR capsule TAKE 1 CAPSULE(40 MG) BY MOUTH TWICE DAILY 60 capsule 2     PNV no.95/ferrous fum/folic ac (PRENATAL ORAL) Take 1 tablet by mouth once daily.

## 2025-07-22 NOTE — L&D DELIVERY NOTE
Vaginal Delivery Note    Patient Name: Geno Julien  : 2000  MRN: [id number]  Age: 25 y.o.    /Para:   Gestational Age: 39w5d    Date of Delivery: 2025    Procedure: Normal Spontaneous Vaginal Delivery, Vaginal Birth after Cessarean    Delivery Provider: Rd Pizarro MD      Resident/Fellow/Other Assistant: none    Description of Procedure:  Delivery of viable infant under epidural anesthesia. Delayed clamping was performed. The infant was placed skin to skin. Cord gases were not sent.  Cord blood was collected. Placenta delivered intact and fundus was firm    No Laceration identified and repaired.    Additional Procedures:  None    Findings:   Amniotic fluid Clear, Female infant in Vertex Occiput Anterior presentation, APGARS 9 , 9 .  Birth Weight 3.62 kg.    Complications: None    Quantitative Blood Loss:   Delivery QBL: 150 mL (2025  8:50 AM - 2025 11:53 AM)    Blood products:      Uterotonics/Hemostatic Agent: IV Pitocin 30 units    Specimen:   Placenta  Delivered: 2025  8:53 AM  Appearance: Intact  Removal: Spontaneous    Disposition: discarded    Sponge/Instrument/Needle Counts: The sponge, lap and needle counts were correct.    Patient Disposition: Patient recovering on labor and delivery in stable condition.    Jagdeep Julien [[id number]]      Labor Events    Rupture date/time: 2025 0625  Rupture type: Spontaneous  Fluid color: Clear  Fluid odor: None  Labor type: Induced Onset of Labor  Labor allowed to proceed with plans for an attempted vaginal birth?: Yes  Induction: Oxytocin  Induction indications: Risk Reducing  Complications: None       Labor Event Times    Dilation complete date/time: 2025 0843  Start pushing date/time: 2025 08:45       Labor Length    2nd stage: 0h 07m  3rd stage: 0h 03m       Placenta    Placenta delivery date/time: 2025 08:53  Placenta removal: Spontaneous  Placenta appearance: Intact  Placenta  disposition: discarded       Cord    Vessels: 3 vessels  Complications: None  Cord blood disposition: Lab  Gases sent?: No  Stem cell collection (by provider): No       Lacerations    Episiotomy: None  Perineal laceration: None  Other lacerations?: No  Repair suture: None       Anesthesia    Method: Epidural       Operative Delivery    Forceps attempted?: No  Vacuum extractor attempted?: No       Shoulder Dystocia    Shoulder dystocia present?: No       [place] Delivery    Birth date/time: 2025 08:50:00  Delivery type: Vaginal, Spontaneous  Complications: None       Resuscitation    Method: None, Tactile stimulation       Apgars    Living status: Living  Apgar Component Scores:  1 min.:  5 min.:  10 min.:  15 min.:  20 min.:    Skin color:  1  1       Heart rate:  2  2       Reflex irritability:  2  2       Muscle tone:  2  2       Respiratory effort:  2  2       Total:  9  9       Apgars assigned by: BETHANY       Delivery Providers    Delivering clinician: Rd Pizarro MD   Provider Role    Mirela Aldrich RN Delivery Nurse    Jami Hernandez RN Nursery Nurse     Resident

## 2025-07-23 ENCOUNTER — APPOINTMENT (OUTPATIENT)
Facility: CLINIC | Age: 25
End: 2025-07-23
Payer: COMMERCIAL

## 2025-07-23 VITALS
BODY MASS INDEX: 28.28 KG/M2 | DIASTOLIC BLOOD PRESSURE: 68 MMHG | HEIGHT: 62 IN | RESPIRATION RATE: 16 BRPM | OXYGEN SATURATION: 98 % | WEIGHT: 153.66 LBS | SYSTOLIC BLOOD PRESSURE: 115 MMHG | TEMPERATURE: 98.1 F | HEART RATE: 77 BPM

## 2025-07-23 LAB
BLOOD EXPIRATION DATE: NORMAL
BLOOD EXPIRATION DATE: NORMAL
DISPENSE STATUS: NORMAL
DISPENSE STATUS: NORMAL
ERYTHROCYTE [DISTWIDTH] IN BLOOD BY AUTOMATED COUNT: 14.2 % (ref 11.5–14.5)
HCT VFR BLD AUTO: 28 % (ref 36–46)
HGB BLD-MCNC: 8.7 G/DL (ref 12–16)
MCH RBC QN AUTO: 24 PG (ref 26–34)
MCHC RBC AUTO-ENTMCNC: 31.1 G/DL (ref 32–36)
MCV RBC AUTO: 77 FL (ref 80–100)
NRBC BLD-RTO: 0 /100 WBCS (ref 0–0)
PLATELET # BLD AUTO: 332 X10*3/UL (ref 150–450)
PRODUCT BLOOD TYPE: 600
PRODUCT BLOOD TYPE: 9500
PRODUCT CODE: NORMAL
PRODUCT CODE: NORMAL
RBC # BLD AUTO: 3.63 X10*6/UL (ref 4–5.2)
UNIT ABO: NORMAL
UNIT ABO: NORMAL
UNIT NUMBER: NORMAL
UNIT NUMBER: NORMAL
UNIT RH: NORMAL
UNIT RH: NORMAL
UNIT VOLUME: 350
UNIT VOLUME: 350
WBC # BLD AUTO: 12.7 X10*3/UL (ref 4.4–11.3)
XM INTEP: NORMAL
XM INTEP: NORMAL

## 2025-07-23 PROCEDURE — 36415 COLL VENOUS BLD VENIPUNCTURE: CPT | Performed by: OBSTETRICS & GYNECOLOGY

## 2025-07-23 PROCEDURE — 85027 COMPLETE CBC AUTOMATED: CPT | Performed by: OBSTETRICS & GYNECOLOGY

## 2025-07-23 PROCEDURE — 2500000001 HC RX 250 WO HCPCS SELF ADMINISTERED DRUGS (ALT 637 FOR MEDICARE OP): Performed by: ADVANCED PRACTICE MIDWIFE

## 2025-07-23 PROCEDURE — 2500000004 HC RX 250 GENERAL PHARMACY W/ HCPCS (ALT 636 FOR OP/ED): Performed by: OBSTETRICS & GYNECOLOGY

## 2025-07-23 RX ADMIN — IBUPROFEN 600 MG: 100 SUSPENSION ORAL at 07:43

## 2025-07-23 RX ADMIN — ENOXAPARIN SODIUM 40 MG: 100 INJECTION SUBCUTANEOUS at 07:43

## 2025-07-23 RX ADMIN — ACETAMINOPHEN 650 MG: 650 SOLUTION ORAL at 07:43

## 2025-07-23 ASSESSMENT — PAIN SCALES - GENERAL
PAINLEVEL_OUTOF10: 0 - NO PAIN
PAINLEVEL_OUTOF10: 0 - NO PAIN

## 2025-07-23 NOTE — CARE PLAN
The patient's goals for the shift include Discharge home    The clinical goals for the shift include Pain level less than 4; progresses activity    VSS. Independent with self care. Discharged home, verbalizing understanding of discharge instructions and follow up recommendations  Problem: Postpartum  Goal: Experiences normal postpartum course  Outcome: Met  Goal: Appropriate maternal -  bonding  Outcome: Met  Goal: Establish and maintain infant feeding pattern for adequate nutrition  Outcome: Met  Goal: Incisions, wounds, or drain sites healing without S/S of infection  Outcome: Met     Problem: Pain - Adult  Goal: Verbalizes/displays adequate comfort level or baseline comfort level  Outcome: Met     Problem: Discharge Planning  Goal: Discharge to home or other facility with appropriate resources  Outcome: Met

## 2025-07-23 NOTE — PROGRESS NOTES
Postpartum Progress Note    Assessment/Plan   Geno Julien is a 25 y.o., , who delivered at 39w5d gestation and is now postpartum day 1.  -doing well, denies complaints  -bleeding has been light    Subjective   Her pain is well controlled with current medications  She is passing flatus  She is ambulating well  She is tolerating a Adult diet Regular  She reports no breast or nursing problems  She denies emotional concerns today   Her plan for contraception is condoms    Objective     Last Vitals:  Temp Pulse Resp BP MAP Pulse Ox   37 °C (98.6 °F) 84 18 114/74 88 99 %     Vitals Min/Max Last 24 Hours:  Temp  Min: 36.6 °C (97.9 °F)  Max: 37.3 °C (99.1 °F)  Pulse  Min: 71  Max: 106  Resp  Min: 16  Max: 18  BP  Min: 104/69  Max: 133/62  MAP (mmHg)  Min: 73  Max: 96    Intake/Output:     Intake/Output Summary (Last 24 hours) at 2025 0722  Last data filed at 2025 1223  Gross per 24 hour   Intake 500 ml   Output 530 ml   Net -30 ml     Physical Exam:  Physical Exam  Constitutional:       General: She is not in acute distress.     Appearance: Normal appearance.   Pulmonary:      Effort: Pulmonary effort is normal.   Abdominal:      General: Bowel sounds are normal.      Palpations: Abdomen is soft.      Uterus firm, below U  Musculoskeletal:         General: Normal range of motion.   Neurological:      Mental Status: She is alert.   Psychiatric:         Mood and Affect: Mood normal.     Lab Data:  Lab Results   Component Value Date    WBC 12.7 (H) 2025    HGB 8.7 (L) 2025    HCT 28.0 (L) 2025     2025

## 2025-07-23 NOTE — LACTATION NOTE
This note was copied from a baby's chart.  Lactation Consultant Note     [id number]   Lactation Consultation   Reason for Consult Follow-up assessment  (discharge teaching)   Consultant Name Jj Samuels   Maternal Information   Has mother  before? Yes   How long did the mother previously breastfeed? pumped for a few months   Previous Maternal Breastfeeding Challenges   (infant with dietary intolerances)   Infant to breast within first 2 hours of birth? No   Exclusive Pump and Bottle Feed Yes   Maternal Assessment   Breast Assessment Breast changes observed in pregnancy   Nipple Assessment Intact  (per mother)   Alterations in Nipple Condition   (denies)   [place] Assessment   (did not assess)   Infant Assessment   Infant Behavior   (sleeping)   Infant Assessment   (Full term infant, approximately 25 HOL, 1% weight loss)   Feeding Assessment   Nutrition Source Breastmilk;Formula (per mother’s request)   Feeding Method Feeding expressed breastmilk;Paced bottle   Unable to assess infant feeding at this time Maternal request   Breast Pump   Pump Hospital grade electric pump   Frequency Less than 3 times per day   Duration Initiate phase   Breast Shield Size and Type 24 mm   Units of Volume Drops   Patient Follow-Up   Inpatient Lactation Follow-up Needed  No   Outpatient Lactation Follow-up Recommended   Lactation Professional - OK to Discharge Yes       Recommendations/Summary  25 year old  exclusively pumping and feeding mother and infant preparing for discharge. Mother verbalizes confidence with feeding infant upon discharge. Reviewed importance of breast stimulation early and often to promote an adequate milk supply. Reviewed ways to maintain milk supply. Educated mother on normal  feeding patterns and behaviors. Reviewed formula safety.     Breastfeeding handouts provided. Breastfeeding education and support provided throughout consult. Parents provided with the opportunity to ask questions. All  questions answered. See education flow sheet for detailed list of education topics covered.  Parents deny any further questions or concerns at this time. Encouraged mother to follow up with outpatient lactation as needed or desired. Offered ongoing breastfeeding assistance, support and education as needed and desired.

## 2025-08-27 ENCOUNTER — APPOINTMENT (OUTPATIENT)
Facility: CLINIC | Age: 25
End: 2025-08-27
Payer: COMMERCIAL

## 2025-08-27 VITALS
DIASTOLIC BLOOD PRESSURE: 66 MMHG | SYSTOLIC BLOOD PRESSURE: 104 MMHG | WEIGHT: 136 LBS | BODY MASS INDEX: 25.03 KG/M2 | HEIGHT: 62 IN

## 2025-08-27 DIAGNOSIS — Z12.4 CERVICAL CANCER SCREENING: ICD-10-CM

## 2025-08-27 PROBLEM — O99.019 ANEMIA, ANTEPARTUM: Status: RESOLVED | Noted: 2025-05-07 | Resolved: 2025-07-22

## 2025-08-27 PROCEDURE — 99024 POSTOP FOLLOW-UP VISIT: CPT | Performed by: OBSTETRICS & GYNECOLOGY

## 2025-08-27 ASSESSMENT — EDINBURGH POSTNATAL DEPRESSION SCALE (EPDS)
I HAVE BEEN ABLE TO LAUGH AND SEE THE FUNNY SIDE OF THINGS: AS MUCH AS I ALWAYS COULD
I HAVE BEEN SO UNHAPPY THAT I HAVE BEEN CRYING: NO, NEVER
I HAVE LOOKED FORWARD WITH ENJOYMENT TO THINGS: AS MUCH AS I EVER DID
TOTAL SCORE: 7
I HAVE BEEN SO UNHAPPY THAT I HAVE HAD DIFFICULTY SLEEPING: NOT AT ALL
I HAVE FELT SCARED OR PANICKY FOR NO GOOD REASON: YES, SOMETIMES
I HAVE FELT SAD OR MISERABLE: NO, NOT AT ALL
THINGS HAVE BEEN GETTING ON TOP OF ME: NO, MOST OF THE TIME I HAVE COPED QUITE WELL
I HAVE BEEN ANXIOUS OR WORRIED FOR NO GOOD REASON: YES, SOMETIMES
THE THOUGHT OF HARMING MYSELF HAS OCCURRED TO ME: NEVER
I HAVE BLAMED MYSELF UNNECESSARILY WHEN THINGS WENT WRONG: YES, SOME OF THE TIME

## 2025-09-01 ENCOUNTER — PATIENT MESSAGE (OUTPATIENT)
Dept: PRIMARY CARE | Facility: CLINIC | Age: 25
End: 2025-09-01
Payer: COMMERCIAL

## 2025-09-01 DIAGNOSIS — K21.9 GERD WITHOUT ESOPHAGITIS: ICD-10-CM

## 2025-09-02 RX ORDER — OMEPRAZOLE 40 MG/1
40 CAPSULE, DELAYED RELEASE ORAL
Qty: 90 CAPSULE | Refills: 1 | Status: SHIPPED | OUTPATIENT
Start: 2025-09-02 | End: 2026-03-01